# Patient Record
Sex: FEMALE | Race: WHITE | NOT HISPANIC OR LATINO | Employment: FULL TIME | URBAN - METROPOLITAN AREA
[De-identification: names, ages, dates, MRNs, and addresses within clinical notes are randomized per-mention and may not be internally consistent; named-entity substitution may affect disease eponyms.]

---

## 2017-05-10 ENCOUNTER — ALLSCRIPTS OFFICE VISIT (OUTPATIENT)
Dept: OTHER | Facility: OTHER | Age: 27
End: 2017-05-10

## 2017-05-10 LAB — S PYO AG THROAT QL: POSITIVE

## 2017-07-28 ENCOUNTER — TRANSCRIBE ORDERS (OUTPATIENT)
Dept: LAB | Facility: HOSPITAL | Age: 27
End: 2017-07-28

## 2017-07-28 ENCOUNTER — APPOINTMENT (OUTPATIENT)
Dept: LAB | Facility: HOSPITAL | Age: 27
End: 2017-07-28
Payer: COMMERCIAL

## 2017-07-28 DIAGNOSIS — Z00.8 HEALTH EXAMINATION IN POPULATION SURVEY: Primary | ICD-10-CM

## 2017-07-28 DIAGNOSIS — Z00.8 HEALTH EXAMINATION IN POPULATION SURVEY: ICD-10-CM

## 2017-07-28 LAB
CHOLEST SERPL-MCNC: 159 MG/DL (ref 50–200)
EST. AVERAGE GLUCOSE BLD GHB EST-MCNC: 105 MG/DL
HBA1C MFR BLD: 5.3 % (ref 4.2–6.3)
HDLC SERPL-MCNC: 53 MG/DL (ref 40–60)
LDLC SERPL CALC-MCNC: 91 MG/DL (ref 0–100)
TRIGL SERPL-MCNC: 75 MG/DL

## 2017-07-28 PROCEDURE — 83036 HEMOGLOBIN GLYCOSYLATED A1C: CPT

## 2017-07-28 PROCEDURE — 36415 COLL VENOUS BLD VENIPUNCTURE: CPT

## 2017-07-28 PROCEDURE — 80061 LIPID PANEL: CPT

## 2017-09-14 ENCOUNTER — GENERIC CONVERSION - ENCOUNTER (OUTPATIENT)
Dept: OTHER | Facility: OTHER | Age: 27
End: 2017-09-14

## 2017-09-27 ENCOUNTER — GENERIC CONVERSION - ENCOUNTER (OUTPATIENT)
Dept: OTHER | Facility: OTHER | Age: 27
End: 2017-09-27

## 2017-12-13 ENCOUNTER — GENERIC CONVERSION - ENCOUNTER (OUTPATIENT)
Dept: OTHER | Facility: OTHER | Age: 27
End: 2017-12-13

## 2017-12-13 ENCOUNTER — LAB REQUISITION (OUTPATIENT)
Dept: LAB | Facility: HOSPITAL | Age: 27
End: 2017-12-13
Payer: COMMERCIAL

## 2017-12-13 DIAGNOSIS — Z01.419 ENCOUNTER FOR GYNECOLOGICAL EXAMINATION WITHOUT ABNORMAL FINDING: ICD-10-CM

## 2017-12-13 PROCEDURE — G0145 SCR C/V CYTO,THINLAYER,RESCR: HCPCS | Performed by: OBSTETRICS & GYNECOLOGY

## 2017-12-20 LAB
LAB AP GYN PRIMARY INTERPRETATION: NORMAL
LAB AP LMP: NORMAL
Lab: NORMAL

## 2018-01-09 NOTE — MISCELLANEOUS
Message   Recorded as Task   Date: 2017 11:14 PM, Created By: Damien Patel   Task Name: Care Coordination   Assigned To: David Elizabeth   Regarding Patient: Noralyn Mohs, Status: Active   Comment:    [place],Raine - 2017 11:14 PM     TASK CREATED  Can you book Vicky Cedeño for an annual on  @ 0820 with me  She is aware of appt, and i'll come in early  David Elizabeth - 2017 8:50 AM     TASK REPLIED TO: Previously Assigned To Jeffrainer Elizabeth  Call pt no answer left message to call me bk  David Elizabeth - 2017 9:39 AM     TASK REPLIED TO: Previously Assigned To David Elizabeth   called again no answer left message to call our office to schedule yearly exam on 17 @8:20am if pt calls please add pt  Active Problems    1  Acne (706 1) (L70 9)   2  Hypertriglyceridemia (272 1) (E78 1)   3  Spontaneous  (634 90) (O03 9)    Current Meds   1  Amoxicillin-Pot Clavulanate 500-125 MG Oral Tablet (Augmentin); TAKE 1 TABLET BY   MOUTH 3 TIMES DAILY with food; Therapy: [id number] to (Last Rx:2017)  Requested for: [id number] Ordered   2  Benzoyl Peroxide 5 % External Gel; APPLY SPARINGLY TO THE AFFECTED AREA(S)   ONCE OR TWICE DAILY AS DIRECTED; Therapy: [id number] to (Last Rx:2016)  Requested for: [id number] Ordered   3  Ibuprofen 200 MG Oral Tablet; TAKE 3-4 TABLETS EVERY 6-8 HOURS WITH MEALS as   needed; Therapy: [id number] to (Last Rx:[id number]) Ordered   4  Mirena (52 MG) 20 MCG/24HR Intrauterine Intrauterine Device; USE AS DIRECTED; Therapy: (Recorded:2016) to Recorded    Allergies    1  No Known Drug Allergies    2   Seasonal    Signatures   Electronically signed by : Lissett Orourke MA; 2017  9:39AM EST                       (Author)

## 2018-01-14 VITALS
BODY MASS INDEX: 37.84 KG/M2 | DIASTOLIC BLOOD PRESSURE: 84 MMHG | HEART RATE: 88 BPM | WEIGHT: 241.13 LBS | TEMPERATURE: 98.7 F | RESPIRATION RATE: 16 BRPM | HEIGHT: 67 IN | SYSTOLIC BLOOD PRESSURE: 126 MMHG

## 2018-01-22 VITALS
HEIGHT: 67 IN | WEIGHT: 246 LBS | DIASTOLIC BLOOD PRESSURE: 80 MMHG | SYSTOLIC BLOOD PRESSURE: 120 MMHG | BODY MASS INDEX: 38.61 KG/M2

## 2018-01-22 VITALS
WEIGHT: 239 LBS | HEIGHT: 67 IN | SYSTOLIC BLOOD PRESSURE: 140 MMHG | BODY MASS INDEX: 37.51 KG/M2 | DIASTOLIC BLOOD PRESSURE: 90 MMHG

## 2018-01-24 VITALS
DIASTOLIC BLOOD PRESSURE: 70 MMHG | HEIGHT: 67 IN | WEIGHT: 235 LBS | SYSTOLIC BLOOD PRESSURE: 120 MMHG | BODY MASS INDEX: 36.88 KG/M2

## 2018-02-14 ENCOUNTER — OFFICE VISIT (OUTPATIENT)
Dept: OBGYN CLINIC | Facility: CLINIC | Age: 28
End: 2018-02-14
Payer: COMMERCIAL

## 2018-02-14 DIAGNOSIS — N91.2 AMENORRHEA: Primary | ICD-10-CM

## 2018-02-14 DIAGNOSIS — Z67.91 RH NEGATIVE STATE IN ANTEPARTUM PERIOD: ICD-10-CM

## 2018-02-14 DIAGNOSIS — O26.899 RH NEGATIVE STATE IN ANTEPARTUM PERIOD: ICD-10-CM

## 2018-02-14 PROCEDURE — 76817 TRANSVAGINAL US OBSTETRIC: CPT | Performed by: OBSTETRICS & GYNECOLOGY

## 2018-02-14 NOTE — PROGRESS NOTES
Early OB Ultrasound Procedure Note    Referring Physician: No ref  provider found  Technician: Study performed by the interpreting physician    Indications:  amenorrhea     LMP 12/26/17, notes that her menses were slighly longer than normal ~ 35d after her miscarriage this fall  Has had some very light brown spotting the last two days  Is Rh negative    No LMP recorded  , , with EGA of  7 weeks and 1   days      Procedure Details    Intrauterine gestational sac, + yolk sac, + very small fetal pole with ? Slow FHT       Findings:  Suspect early pregnancy, too early to confirm dating, will follow up in 2 weeks  Will check T&S  Encounter Diagnosis   Name Primary?     Rh negative state in antepartum period Yes

## 2018-02-23 ENCOUNTER — APPOINTMENT (OUTPATIENT)
Dept: LAB | Facility: HOSPITAL | Age: 28
End: 2018-02-23
Attending: OBSTETRICS & GYNECOLOGY
Payer: COMMERCIAL

## 2018-02-23 DIAGNOSIS — O26.899 RH NEGATIVE STATE IN ANTEPARTUM PERIOD: ICD-10-CM

## 2018-02-23 DIAGNOSIS — Z67.91 RH NEGATIVE STATE IN ANTEPARTUM PERIOD: ICD-10-CM

## 2018-02-23 LAB
ABO GROUP BLD: NORMAL
BLD GP AB SCN SERPL QL: NEGATIVE
RH BLD: NEGATIVE
SPECIMEN EXPIRATION DATE: NORMAL

## 2018-02-23 PROCEDURE — 86900 BLOOD TYPING SEROLOGIC ABO: CPT

## 2018-02-23 PROCEDURE — 86901 BLOOD TYPING SEROLOGIC RH(D): CPT

## 2018-02-23 PROCEDURE — 36415 COLL VENOUS BLD VENIPUNCTURE: CPT

## 2018-02-23 PROCEDURE — 86850 RBC ANTIBODY SCREEN: CPT

## 2018-02-26 DIAGNOSIS — O21.9 NAUSEA AND VOMITING DURING PREGNANCY: Primary | ICD-10-CM

## 2018-02-26 RX ORDER — ONDANSETRON 4 MG/1
4 TABLET, FILM COATED ORAL EVERY 6 HOURS PRN
Qty: 60 TABLET | Refills: 0 | Status: SHIPPED | OUTPATIENT
Start: 2018-02-26 | End: 2018-05-31

## 2018-02-28 ENCOUNTER — ULTRASOUND (OUTPATIENT)
Dept: OBGYN CLINIC | Facility: CLINIC | Age: 28
End: 2018-02-28
Payer: COMMERCIAL

## 2018-02-28 DIAGNOSIS — Z34.81 PRENATAL CARE, SUBSEQUENT PREGNANCY, FIRST TRIMESTER: Primary | ICD-10-CM

## 2018-02-28 DIAGNOSIS — O26.891 RH NEGATIVE STATUS DURING PREGNANCY IN FIRST TRIMESTER: ICD-10-CM

## 2018-02-28 DIAGNOSIS — Z67.91 RH NEGATIVE STATUS DURING PREGNANCY IN FIRST TRIMESTER: ICD-10-CM

## 2018-02-28 PROCEDURE — 76817 TRANSVAGINAL US OBSTETRIC: CPT | Performed by: OBSTETRICS & GYNECOLOGY

## 2018-02-28 NOTE — PROGRESS NOTES
Patient here for viability confirmation  Since her last visit has had increase in pregnancy symptoms - + nausea daily  Taking zofran PRN  She has not had any further bleeding  Blood type is A negative, Antibody negative  Will need rhogam 28wk and with any bleeding  TVUS performed today  Findings:  Viable IUP measures 8 wks  +   Normal adnexa b/l  EDC 10-10-18 based on US    PN panel slip provided to patient  Desires genetic screening - referral to [facility]  Will return for PN1 and OB intake

## 2018-03-14 ENCOUNTER — INITIAL PRENATAL (OUTPATIENT)
Dept: OBGYN CLINIC | Facility: CLINIC | Age: 28
End: 2018-03-14

## 2018-03-14 VITALS
BODY MASS INDEX: 37.33 KG/M2 | WEIGHT: 232.3 LBS | DIASTOLIC BLOOD PRESSURE: 80 MMHG | HEIGHT: 66 IN | SYSTOLIC BLOOD PRESSURE: 118 MMHG

## 2018-03-14 DIAGNOSIS — O26.891 RH NEGATIVE STATUS DURING PREGNANCY IN FIRST TRIMESTER: ICD-10-CM

## 2018-03-14 DIAGNOSIS — Z67.91 RH NEGATIVE STATUS DURING PREGNANCY IN FIRST TRIMESTER: ICD-10-CM

## 2018-03-14 PROCEDURE — OBC: Performed by: OBSTETRICS & GYNECOLOGY

## 2018-03-14 RX ORDER — VITAMIN A, VITAMIN C, VITAMIN D-3, VITAMIN E, VITAMIN B-1, VITAMIN B-2, NIACIN, VITAMIN B-6, CALCIUM, IRON, ZINC, COPPER 4000; 120; 400; 22; 1.84; 3; 20; 10; 1; 12; 200; 27; 25; 2 [IU]/1; MG/1; [IU]/1; MG/1; MG/1; MG/1; MG/1; MG/1; MG/1; UG/1; MG/1; MG/1; MG/1; MG/1
TABLET ORAL
Status: ON HOLD | COMMUNITY
End: 2020-07-20

## 2018-03-14 NOTE — PROGRESS NOTES
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

## 2018-03-15 ENCOUNTER — APPOINTMENT (OUTPATIENT)
Dept: LAB | Facility: HOSPITAL | Age: 28
End: 2018-03-15
Attending: OBSTETRICS & GYNECOLOGY
Payer: COMMERCIAL

## 2018-03-15 DIAGNOSIS — Z34.81 PRENATAL CARE, SUBSEQUENT PREGNANCY, FIRST TRIMESTER: ICD-10-CM

## 2018-03-15 LAB
BASOPHILS # BLD AUTO: 0.03 THOUSANDS/ΜL (ref 0–0.1)
BASOPHILS NFR BLD AUTO: 0 % (ref 0–1)
EOSINOPHIL # BLD AUTO: 0.09 THOUSAND/ΜL (ref 0–0.61)
EOSINOPHIL NFR BLD AUTO: 1 % (ref 0–6)
ERYTHROCYTE [DISTWIDTH] IN BLOOD BY AUTOMATED COUNT: 13.6 % (ref 11.6–15.1)
HBV SURFACE AG SER QL: NORMAL
HCT VFR BLD AUTO: 38.9 % (ref 34.8–46.1)
HGB BLD-MCNC: 13.1 G/DL (ref 11.5–15.4)
LYMPHOCYTES # BLD AUTO: 1.99 THOUSANDS/ΜL (ref 0.6–4.47)
LYMPHOCYTES NFR BLD AUTO: 21 % (ref 14–44)
MCH RBC QN AUTO: 27.9 PG (ref 26.8–34.3)
MCHC RBC AUTO-ENTMCNC: 33.7 G/DL (ref 31.4–37.4)
MCV RBC AUTO: 83 FL (ref 82–98)
MONOCYTES # BLD AUTO: 0.47 THOUSAND/ΜL (ref 0.17–1.22)
MONOCYTES NFR BLD AUTO: 5 % (ref 4–12)
NEUTROPHILS # BLD AUTO: 6.72 THOUSANDS/ΜL (ref 1.85–7.62)
NEUTS SEG NFR BLD AUTO: 73 % (ref 43–75)
NRBC BLD AUTO-RTO: 0 /100 WBCS
PLATELET # BLD AUTO: 280 THOUSANDS/UL (ref 149–390)
PMV BLD AUTO: 9.9 FL (ref 8.9–12.7)
RBC # BLD AUTO: 4.7 MILLION/UL (ref 3.81–5.12)
RUBV IGG SERPL IA-ACNC: 38.2 IU/ML
WBC # BLD AUTO: 9.32 THOUSAND/UL (ref 4.31–10.16)

## 2018-03-15 PROCEDURE — 87086 URINE CULTURE/COLONY COUNT: CPT

## 2018-03-15 PROCEDURE — 86592 SYPHILIS TEST NON-TREP QUAL: CPT

## 2018-03-15 PROCEDURE — 85025 COMPLETE CBC W/AUTO DIFF WBC: CPT

## 2018-03-15 PROCEDURE — 86762 RUBELLA ANTIBODY: CPT

## 2018-03-15 PROCEDURE — 36415 COLL VENOUS BLD VENIPUNCTURE: CPT

## 2018-03-15 PROCEDURE — 87340 HEPATITIS B SURFACE AG IA: CPT

## 2018-03-15 PROCEDURE — 87389 HIV-1 AG W/HIV-1&-2 AB AG IA: CPT

## 2018-03-16 LAB
BACTERIA UR CULT: NORMAL
HIV 1+2 AB+HIV1 P24 AG SERPL QL IA: NORMAL
RPR SER QL: NORMAL

## 2018-03-20 ENCOUNTER — TELEPHONE (OUTPATIENT)
Dept: OBGYN CLINIC | Facility: CLINIC | Age: 28
End: 2018-03-20

## 2018-03-20 NOTE — TELEPHONE ENCOUNTER
Roman Valderrama from [facility] called regarding swab that was sent for testing SMA and Cf  Advised that they are not able to run the test due to insufficent cells/DNA on swab

## 2018-03-29 ENCOUNTER — ROUTINE PRENATAL (OUTPATIENT)
Dept: PERINATAL CARE | Facility: CLINIC | Age: 28
End: 2018-03-29
Payer: COMMERCIAL

## 2018-03-29 ENCOUNTER — INITIAL PRENATAL (OUTPATIENT)
Dept: OBGYN CLINIC | Facility: CLINIC | Age: 28
End: 2018-03-29

## 2018-03-29 VITALS
DIASTOLIC BLOOD PRESSURE: 84 MMHG | HEIGHT: 66 IN | HEART RATE: 91 BPM | BODY MASS INDEX: 38.15 KG/M2 | SYSTOLIC BLOOD PRESSURE: 139 MMHG | WEIGHT: 237.4 LBS

## 2018-03-29 VITALS — BODY MASS INDEX: 39.35 KG/M2 | DIASTOLIC BLOOD PRESSURE: 81 MMHG | SYSTOLIC BLOOD PRESSURE: 120 MMHG | WEIGHT: 243.8 LBS

## 2018-03-29 DIAGNOSIS — Z3A.12 12 WEEKS GESTATION OF PREGNANCY: Primary | ICD-10-CM

## 2018-03-29 DIAGNOSIS — Z34.81 PRENATAL CARE, SUBSEQUENT PREGNANCY, FIRST TRIMESTER: Primary | ICD-10-CM

## 2018-03-29 DIAGNOSIS — Z67.91 RH NEGATIVE STATUS DURING PREGNANCY IN FIRST TRIMESTER: ICD-10-CM

## 2018-03-29 DIAGNOSIS — Z34.81 PRENATAL CARE, SUBSEQUENT PREGNANCY, FIRST TRIMESTER: ICD-10-CM

## 2018-03-29 DIAGNOSIS — O26.891 RH NEGATIVE STATUS DURING PREGNANCY IN FIRST TRIMESTER: ICD-10-CM

## 2018-03-29 DIAGNOSIS — Z3A.12 12 WEEKS GESTATION OF PREGNANCY: ICD-10-CM

## 2018-03-29 DIAGNOSIS — Z36.82 ENCOUNTER FOR ANTENATAL SCREENING FOR NUCHAL TRANSLUCENCY: Primary | ICD-10-CM

## 2018-03-29 PROCEDURE — 76813 OB US NUCHAL MEAS 1 GEST: CPT | Performed by: OBSTETRICS & GYNECOLOGY

## 2018-03-29 PROCEDURE — 76801 OB US < 14 WKS SINGLE FETUS: CPT | Performed by: OBSTETRICS & GYNECOLOGY

## 2018-03-29 PROCEDURE — 87491 CHLMYD TRACH DNA AMP PROBE: CPT | Performed by: OBSTETRICS & GYNECOLOGY

## 2018-03-29 PROCEDURE — 87591 N.GONORRHOEAE DNA AMP PROB: CPT | Performed by: OBSTETRICS & GYNECOLOGY

## 2018-03-29 PROCEDURE — PNV: Performed by: OBSTETRICS & GYNECOLOGY

## 2018-03-29 PROCEDURE — 99201 PR OFFICE OUTPATIENT NEW 10 MINUTES: CPT | Performed by: OBSTETRICS & GYNECOLOGY

## 2018-03-29 NOTE — PROGRESS NOTES
Problem List Items Addressed This Visit        Other    Rh negative status during pregnancy in first trimester    Prenatal care, subsequent pregnancy, first trimester - Primary     Cultures obtained  Had part 1 of sequential screen today - possible marginal cord insertion seen today  Repeat CF/SMA testing collected today as last sample was inadequate  PN panel reviewed and WNL, Rh neg, patient aware  Some elevation in BP noted at [facility] today, patient is going to start LDASA

## 2018-03-29 NOTE — ASSESSMENT & PLAN NOTE
Cultures obtained  Had part 1 of sequential screen today - possible marginal cord insertion seen today  Repeat CF/SMA testing collected today as last sample was inadequate  PN panel reviewed and WNL, Rh neg, patient aware  Some elevation in BP noted at [facility] today, patient is going to start LDASA

## 2018-03-29 NOTE — PATIENT INSTRUCTIONS
Thank you for choosing [facility] for your  care today  If you have any questions about your ultrasound or care, please do not hesitate to contact us or your primary obstetrician  Please return in 7 weeks for your next ultrasound to check on the fetal anatomy  Please consider purchasing a blood pressure cuff to check your blood pressure at home  This will help your providers follow your blood pressure and decide if any additional testing or monitoring is necessary  Please try to keep your weight gain to 11-20 pounds this pregnancy; this is best accomplished by regular aerobic exercise and healthy eating  Also please let me know if you need anything at all      Sincerely,    Ginny Baker MD  Attending Physician, [facility]

## 2018-03-29 NOTE — PROGRESS NOTES
[address]: Dr Mohsen Weber was seen today at 12w1d for nuchal translucency ultrasound  See ultrasound report under "OB Procedures" tab  Please don't hesitate to contact our office with any concerns or questions    Jose Jarvis MD

## 2018-03-30 LAB
CHLAMYDIA DNA CVX QL NAA+PROBE: NORMAL
N GONORRHOEA DNA GENITAL QL NAA+PROBE: NORMAL

## 2018-04-04 ENCOUNTER — TELEPHONE (OUTPATIENT)
Dept: PERINATAL CARE | Facility: CLINIC | Age: 28
End: 2018-04-04

## 2018-04-30 ENCOUNTER — ROUTINE PRENATAL (OUTPATIENT)
Dept: OBGYN CLINIC | Facility: MEDICAL CENTER | Age: 28
End: 2018-04-30

## 2018-04-30 ENCOUNTER — DOCUMENTATION (OUTPATIENT)
Dept: OBGYN CLINIC | Facility: MEDICAL CENTER | Age: 28
End: 2018-04-30

## 2018-04-30 VITALS — BODY MASS INDEX: 37.93 KG/M2 | SYSTOLIC BLOOD PRESSURE: 131 MMHG | DIASTOLIC BLOOD PRESSURE: 83 MMHG | WEIGHT: 235 LBS

## 2018-04-30 DIAGNOSIS — O26.892 RH NEGATIVE STATUS DURING PREGNANCY IN SECOND TRIMESTER: ICD-10-CM

## 2018-04-30 DIAGNOSIS — Z67.91 RH NEGATIVE STATUS DURING PREGNANCY IN SECOND TRIMESTER: ICD-10-CM

## 2018-04-30 DIAGNOSIS — Z34.82 PRENATAL CARE, SUBSEQUENT PREGNANCY, SECOND TRIMESTER: Primary | ICD-10-CM

## 2018-04-30 PROCEDURE — PNV: Performed by: OBSTETRICS & GYNECOLOGY

## 2018-04-30 NOTE — PROGRESS NOTES
Gonzalez Johnson is doing well  Feeling occasional flutters! Dehydrated - but covering Oncology, knows she should drink more water  CF/SMA negative  Has level II US scheduled

## 2018-05-16 ENCOUNTER — ROUTINE PRENATAL (OUTPATIENT)
Dept: PERINATAL CARE | Facility: CLINIC | Age: 28
End: 2018-05-16
Payer: COMMERCIAL

## 2018-05-16 VITALS
HEART RATE: 74 BPM | BODY MASS INDEX: 37.99 KG/M2 | HEIGHT: 66 IN | WEIGHT: 236.4 LBS | SYSTOLIC BLOOD PRESSURE: 115 MMHG | DIASTOLIC BLOOD PRESSURE: 67 MMHG

## 2018-05-16 DIAGNOSIS — O99.212 MATERNAL OBESITY SYNDROME IN SECOND TRIMESTER: Primary | ICD-10-CM

## 2018-05-16 DIAGNOSIS — Z67.91 RH NEGATIVE STATUS DURING PREGNANCY IN FIRST TRIMESTER: ICD-10-CM

## 2018-05-16 DIAGNOSIS — Z36.86 ENCOUNTER FOR ANTENATAL SCREENING FOR CERVICAL LENGTH: ICD-10-CM

## 2018-05-16 DIAGNOSIS — Z3A.19 19 WEEKS GESTATION OF PREGNANCY: ICD-10-CM

## 2018-05-16 DIAGNOSIS — Z34.82 PRENATAL CARE, SUBSEQUENT PREGNANCY, SECOND TRIMESTER: ICD-10-CM

## 2018-05-16 DIAGNOSIS — O26.891 RH NEGATIVE STATUS DURING PREGNANCY IN FIRST TRIMESTER: ICD-10-CM

## 2018-05-16 PROCEDURE — 76817 TRANSVAGINAL US OBSTETRIC: CPT | Performed by: OBSTETRICS & GYNECOLOGY

## 2018-05-16 PROCEDURE — 76811 OB US DETAILED SNGL FETUS: CPT | Performed by: OBSTETRICS & GYNECOLOGY

## 2018-05-16 PROCEDURE — 99212 OFFICE O/P EST SF 10 MIN: CPT | Performed by: OBSTETRICS & GYNECOLOGY

## 2018-05-16 NOTE — PROGRESS NOTES
A transvaginal ultrasound was performed  Sonographer note on use of High Level Disinfection Process (Trophon) for transvaginal probe# 4 used, serial [id number]    Venecia Rock RDMS, RDCS

## 2018-05-16 NOTE — LETTER
May 16, 2018     [address] [place] [address]    Patient: Vivi Tovar   YOB: 1990   Date of Visit: 5/16/2018       Dear Dr Mckeon Levels: Thank you for referring Vivi Tovar to me for evaluation  Below are my notes for this consultation  If you have questions, please do not hesitate to call me  I look forward to following your patient along with you  Sincerely,        Marv Wen MD        CC: No Recipients  Marv Wen MD  5/16/2018  1:46 PM  Sign at close encounter  Please refer to the [facility] ultrasound report in Ob Procedures for additional information regarding the visit to the [facility]  today

## 2018-05-16 NOTE — PROGRESS NOTES
Please refer to the [facility] ultrasound report in Ob Procedures for additional information regarding the visit to the [facility]  today

## 2018-05-18 ENCOUNTER — APPOINTMENT (OUTPATIENT)
Dept: LAB | Facility: HOSPITAL | Age: 28
End: 2018-05-18
Attending: OBSTETRICS & GYNECOLOGY
Payer: COMMERCIAL

## 2018-05-18 ENCOUNTER — TRANSCRIBE ORDERS (OUTPATIENT)
Dept: LAB | Facility: HOSPITAL | Age: 28
End: 2018-05-18

## 2018-05-18 DIAGNOSIS — Z34.92 SECOND TRIMESTER PREGNANCY: Primary | ICD-10-CM

## 2018-05-18 PROCEDURE — 36415 COLL VENOUS BLD VENIPUNCTURE: CPT

## 2018-05-19 LAB — SCAN RESULT: NORMAL

## 2018-05-22 ENCOUNTER — TELEPHONE (OUTPATIENT)
Dept: PERINATAL CARE | Facility: CLINIC | Age: 28
End: 2018-05-22

## 2018-05-31 ENCOUNTER — ROUTINE PRENATAL (OUTPATIENT)
Dept: OBGYN CLINIC | Facility: CLINIC | Age: 28
End: 2018-05-31

## 2018-05-31 VITALS — SYSTOLIC BLOOD PRESSURE: 130 MMHG | WEIGHT: 238.9 LBS | BODY MASS INDEX: 38.56 KG/M2 | DIASTOLIC BLOOD PRESSURE: 70 MMHG

## 2018-05-31 DIAGNOSIS — Z67.91 RH NEGATIVE STATUS DURING PREGNANCY IN FIRST TRIMESTER: Primary | ICD-10-CM

## 2018-05-31 DIAGNOSIS — Z34.82 PRENATAL CARE, SUBSEQUENT PREGNANCY, SECOND TRIMESTER: ICD-10-CM

## 2018-05-31 DIAGNOSIS — O26.891 RH NEGATIVE STATUS DURING PREGNANCY IN FIRST TRIMESTER: Primary | ICD-10-CM

## 2018-05-31 PROCEDURE — PNV: Performed by: OBSTETRICS & GYNECOLOGY

## 2018-05-31 NOTE — ASSESSMENT & PLAN NOTE
Patient doing well  Had level II - fetus did not cooperate for gender! But thinking girl  Has 32 week ultrasound scheduled  Baby moving well

## 2018-06-12 ENCOUNTER — OFFICE VISIT (OUTPATIENT)
Dept: FAMILY MEDICINE CLINIC | Facility: CLINIC | Age: 28
End: 2018-06-12
Payer: COMMERCIAL

## 2018-06-12 VITALS
TEMPERATURE: 98.5 F | WEIGHT: 236.3 LBS | DIASTOLIC BLOOD PRESSURE: 60 MMHG | HEART RATE: 88 BPM | RESPIRATION RATE: 16 BRPM | BODY MASS INDEX: 37.97 KG/M2 | SYSTOLIC BLOOD PRESSURE: 124 MMHG | HEIGHT: 66 IN

## 2018-06-12 DIAGNOSIS — R05.9 COUGH: ICD-10-CM

## 2018-06-12 DIAGNOSIS — J30.1 SEASONAL ALLERGIC RHINITIS DUE TO POLLEN: Primary | ICD-10-CM

## 2018-06-12 PROCEDURE — 3008F BODY MASS INDEX DOCD: CPT | Performed by: FAMILY MEDICINE

## 2018-06-12 PROCEDURE — 99214 OFFICE O/P EST MOD 30 MIN: CPT | Performed by: FAMILY MEDICINE

## 2018-06-12 RX ORDER — FLUTICASONE PROPIONATE 50 MCG
SPRAY, SUSPENSION (ML) NASAL
Qty: 16 G | Refills: 3 | Status: SHIPPED | OUTPATIENT
Start: 2018-06-12 | End: 2018-09-29 | Stop reason: ALTCHOICE

## 2018-06-12 NOTE — PROGRESS NOTES
Assessment/Plan:    Allergic rhinitis  Patient will continue the Claritin but it might take 2 weeks to kick in  In the meantime she will start Flonase 1 spray each nostril twice daily 1 of the times being after washing  She knows since she is pregnant this is about all she can do at this point  If she develops a temperature greater than 101 she will let me know  If she develops colored mucus that last throughout the entire day and more than 48 hours she will let me know  Subjective:   Maribel Elizondo is a 25 y  o female  Chief Complaint   Patient presents with    Cold Like Symptoms    Cough     Yesterday patient started with a itchy eyes and runny nose, as the night went on she got general malaise and started to cough such that she had post tussive emesis as well  She started taking Claritin, some Tylenol and some Mucinex  Mucinex clear the secretions which she just feels like she is drowning in her own fluids    No fevers        Past Medical History:   Diagnosis Date    Pregnancy with uncertain fetal viability     Last assessed - 17    Spontaneous       Social History   Substance Use Topics    Smoking status: Never Smoker    Smokeless tobacco: Never Used    Alcohol use Yes      Comment: social pre-pregnancy     Family History   Problem Relation Age of Onset    Hypertension Mother     Hypertension Father     Arthritis Father     Diabetes Father     Hyperlipidemia Father     Diabetes Maternal Grandmother     Diabetes Maternal Grandfather     Heart attack Maternal Grandfather     Diabetes Paternal Grandmother     Polycystic ovary syndrome Sister     Thyroid disease Brother     Heart attack Paternal Grandfather     Drug abuse Maternal Uncle     Alcohol abuse Maternal Uncle     Drug abuse Paternal Uncle     Alcohol abuse Paternal Uncle     Mental illness Neg Hx        MEDICATIONS REVIEWED AND UPDATED    Rest Of 10 Point Review Of System Negative    Objective:    Vitals: 06/12/18 1000   BP: 124/60   Pulse: 88   Resp: 16   Temp: 98 5 °F (36 9 °C)     Body mass index is 38 14 kg/m²      Physical Exam   Constitutional  Patient is fatigued and congested with a congested voice in no acute distress    Mental Status  Alert, Oriented, Cooperative, Memory function normal , clean, and reasonable    HEENT  TMs are dull turbinates are closed very pale white watery discharge pharynx benign    Neck  No neck mass, No thyromegaly, Good carotid upstrokes bilaterally, trachea midline positive click    Respiratory  Breath sounds normal, No rales, No rhonchi, No wheezing, normal palpation    Cardiac   Regular rhythm without ectopy or murmur no S3-S4, no heave lift or thrill to palpation    Vascular  No leg edema, No pedal edema    Muscular skeletal  No clubbing cyanosis , muscle tone normal    Skin  No appreciable rashes or abnormal appearing lesions

## 2018-06-12 NOTE — PATIENT INSTRUCTIONS
Allergic rhinitis  Patient will continue the Claritin but it might take 2 weeks to kick in  In the meantime she will start Flonase 1 spray each nostril twice daily 1 of the times being after washing  She knows since she is pregnant this is about all she can do at this point  If she develops a temperature greater than 101 she will let me know  If she develops colored mucus that last throughout the entire day and more than 48 hours she will let me know

## 2018-06-14 ENCOUNTER — TELEPHONE (OUTPATIENT)
Dept: OBGYN CLINIC | Facility: CLINIC | Age: 28
End: 2018-06-14

## 2018-06-29 ENCOUNTER — ROUTINE PRENATAL (OUTPATIENT)
Dept: OBGYN CLINIC | Facility: CLINIC | Age: 28
End: 2018-06-29

## 2018-06-29 VITALS — WEIGHT: 237.6 LBS | BODY MASS INDEX: 38.35 KG/M2 | SYSTOLIC BLOOD PRESSURE: 122 MMHG | DIASTOLIC BLOOD PRESSURE: 70 MMHG

## 2018-06-29 DIAGNOSIS — O26.891 RH NEGATIVE STATUS DURING PREGNANCY IN FIRST TRIMESTER: ICD-10-CM

## 2018-06-29 DIAGNOSIS — Z34.92 ENCOUNTER FOR PREGNANCY RELATED EXAMINATION IN SECOND TRIMESTER: ICD-10-CM

## 2018-06-29 DIAGNOSIS — Z67.91 RH NEGATIVE STATUS DURING PREGNANCY IN FIRST TRIMESTER: ICD-10-CM

## 2018-06-29 DIAGNOSIS — Z34.82 ENCOUNTER FOR SUPERVISION OF OTHER NORMAL PREGNANCY IN SECOND TRIMESTER: Primary | ICD-10-CM

## 2018-06-29 PROCEDURE — PNV: Performed by: NURSE PRACTITIONER

## 2018-06-29 RX ORDER — LORATADINE 10 MG/1
10 TABLET ORAL DAILY
COMMUNITY
End: 2020-01-22 | Stop reason: ALTCHOICE

## 2018-06-29 NOTE — ASSESSMENT & PLAN NOTE
Denies OB complaints  Good fetal movement  Denies contractions, cramping, leakage of fluid or vaginal bleeding  28 wk lab slips provided  S/p flu vaccine  Reviewed reasons to call

## 2018-06-29 NOTE — PROGRESS NOTES
Problem List Items Addressed This Visit     Rh negative status during pregnancy in first trimester     Rhogam at 28 weeks planned  Supervision of normal pregnancy in second trimester - Primary     Denies OB complaints  Good fetal movement  Denies contractions, cramping, leakage of fluid or vaginal bleeding  28 wk lab slips provided  S/p flu vaccine  Reviewed reasons to call              Other Visit Diagnoses     Encounter for pregnancy related examination in second trimester        Relevant Orders    CBC and differential    Glucose, 1H PG    RPR    Type and screen

## 2018-07-02 ENCOUNTER — TELEPHONE (OUTPATIENT)
Dept: OBGYN CLINIC | Facility: CLINIC | Age: 28
End: 2018-07-02

## 2018-07-10 ENCOUNTER — TELEPHONE (OUTPATIENT)
Dept: OBGYN CLINIC | Facility: CLINIC | Age: 28
End: 2018-07-10

## 2018-07-10 DIAGNOSIS — O26.892 HEARTBURN DURING PREGNANCY IN SECOND TRIMESTER: Primary | ICD-10-CM

## 2018-07-10 DIAGNOSIS — R12 HEARTBURN DURING PREGNANCY IN SECOND TRIMESTER: Primary | ICD-10-CM

## 2018-07-10 RX ORDER — RANITIDINE HCL 75 MG
75 TABLET ORAL 2 TIMES DAILY
Qty: 60 TABLET | Refills: 3 | Status: SHIPPED | OUTPATIENT
Start: 2018-07-10 | End: 2018-07-13

## 2018-07-13 ENCOUNTER — ROUTINE PRENATAL (OUTPATIENT)
Dept: OBGYN CLINIC | Facility: CLINIC | Age: 28
End: 2018-07-13

## 2018-07-13 VITALS — DIASTOLIC BLOOD PRESSURE: 66 MMHG | WEIGHT: 239.4 LBS | BODY MASS INDEX: 38.64 KG/M2 | SYSTOLIC BLOOD PRESSURE: 128 MMHG

## 2018-07-13 DIAGNOSIS — Z34.82 ENCOUNTER FOR SUPERVISION OF OTHER NORMAL PREGNANCY IN SECOND TRIMESTER: Primary | ICD-10-CM

## 2018-07-13 DIAGNOSIS — K21.9 GASTROESOPHAGEAL REFLUX DISEASE WITHOUT ESOPHAGITIS: ICD-10-CM

## 2018-07-13 DIAGNOSIS — O26.891 RH NEGATIVE STATUS DURING PREGNANCY IN FIRST TRIMESTER: ICD-10-CM

## 2018-07-13 DIAGNOSIS — Z67.91 RH NEGATIVE STATUS DURING PREGNANCY IN FIRST TRIMESTER: ICD-10-CM

## 2018-07-13 PROCEDURE — PNV: Performed by: NURSE PRACTITIONER

## 2018-07-13 RX ORDER — RANITIDINE HCL 75 MG
75 TABLET ORAL 2 TIMES DAILY
Qty: 90 TABLET | Refills: 2 | Status: SHIPPED | OUTPATIENT
Start: 2018-07-13 | End: 2020-01-22 | Stop reason: ALTCHOICE

## 2018-07-13 NOTE — ASSESSMENT & PLAN NOTE
Denies OB complaints  Good fetal movement  Denies contractions, cramping, leakage of fluid or vaginal bleeding  The patient plans to complete 28 week labs  She is aware of Rhogam recommendations and will receive at her next visit  Reviewed reasons to call

## 2018-07-13 NOTE — PROGRESS NOTES
Problem List Items Addressed This Visit     Rh negative status during pregnancy in first trimester    Supervision of normal pregnancy in second trimester - Primary     Denies OB complaints  Good fetal movement  Denies contractions, cramping, leakage of fluid or vaginal bleeding  The patient plans to complete 28 week labs  She is aware of Rhogam recommendations and will receive at her next visit  Reviewed reasons to call  Gastroesophageal reflux disease without esophagitis     Rx provided for Zantac  She is aware of dietary and positional recommendations            Relevant Medications    ranitidine (ZANTAC) 75 MG tablet

## 2018-07-25 ENCOUNTER — APPOINTMENT (OUTPATIENT)
Dept: LAB | Facility: HOSPITAL | Age: 28
End: 2018-07-25
Payer: COMMERCIAL

## 2018-07-25 ENCOUNTER — TRANSCRIBE ORDERS (OUTPATIENT)
Dept: LAB | Facility: HOSPITAL | Age: 28
End: 2018-07-25

## 2018-07-25 DIAGNOSIS — Z00.8 HEALTH EXAMINATION IN POPULATION SURVEY: Primary | ICD-10-CM

## 2018-07-25 DIAGNOSIS — Z34.92 ENCOUNTER FOR PREGNANCY RELATED EXAMINATION IN SECOND TRIMESTER: ICD-10-CM

## 2018-07-25 DIAGNOSIS — Z00.8 HEALTH EXAMINATION IN POPULATION SURVEY: ICD-10-CM

## 2018-07-25 LAB
ABO GROUP BLD: NORMAL
BASOPHILS # BLD AUTO: 0.05 THOUSANDS/ΜL (ref 0–0.1)
BASOPHILS NFR BLD AUTO: 0 % (ref 0–1)
BLD GP AB SCN SERPL QL: NEGATIVE
CHOLEST SERPL-MCNC: 261 MG/DL (ref 50–200)
EOSINOPHIL # BLD AUTO: 0.13 THOUSAND/ΜL (ref 0–0.61)
EOSINOPHIL NFR BLD AUTO: 1 % (ref 0–6)
ERYTHROCYTE [DISTWIDTH] IN BLOOD BY AUTOMATED COUNT: 13.3 % (ref 11.6–15.1)
EST. AVERAGE GLUCOSE BLD GHB EST-MCNC: 91 MG/DL
GLUCOSE 1H P 50 G GLC PO SERPL-MCNC: 79 MG/DL
HBA1C MFR BLD: 4.8 % (ref 4.2–6.3)
HCT VFR BLD AUTO: 34.4 % (ref 34.8–46.1)
HDLC SERPL-MCNC: 71 MG/DL (ref 40–60)
HGB BLD-MCNC: 11.1 G/DL (ref 11.5–15.4)
IMM GRANULOCYTES # BLD AUTO: 0.06 THOUSAND/UL (ref 0–0.2)
IMM GRANULOCYTES NFR BLD AUTO: 1 % (ref 0–2)
LDLC SERPL CALC-MCNC: 155 MG/DL (ref 0–100)
LYMPHOCYTES # BLD AUTO: 2.68 THOUSANDS/ΜL (ref 0.6–4.47)
LYMPHOCYTES NFR BLD AUTO: 23 % (ref 14–44)
MCH RBC QN AUTO: 27.4 PG (ref 26.8–34.3)
MCHC RBC AUTO-ENTMCNC: 32.3 G/DL (ref 31.4–37.4)
MCV RBC AUTO: 85 FL (ref 82–98)
MONOCYTES # BLD AUTO: 0.77 THOUSAND/ΜL (ref 0.17–1.22)
MONOCYTES NFR BLD AUTO: 7 % (ref 4–12)
NEUTROPHILS # BLD AUTO: 8.21 THOUSANDS/ΜL (ref 1.85–7.62)
NEUTS SEG NFR BLD AUTO: 68 % (ref 43–75)
NONHDLC SERPL-MCNC: 190 MG/DL
NRBC BLD AUTO-RTO: 0 /100 WBCS
PLATELET # BLD AUTO: 247 THOUSANDS/UL (ref 149–390)
PMV BLD AUTO: 10.1 FL (ref 8.9–12.7)
RBC # BLD AUTO: 4.05 MILLION/UL (ref 3.81–5.12)
RH BLD: NEGATIVE
RPR SER QL: NORMAL
SPECIMEN EXPIRATION DATE: NORMAL
TRIGL SERPL-MCNC: 177 MG/DL
WBC # BLD AUTO: 11.9 THOUSAND/UL (ref 4.31–10.16)

## 2018-07-25 PROCEDURE — 82950 GLUCOSE TEST: CPT

## 2018-07-25 PROCEDURE — 36415 COLL VENOUS BLD VENIPUNCTURE: CPT

## 2018-07-25 PROCEDURE — 86900 BLOOD TYPING SEROLOGIC ABO: CPT

## 2018-07-25 PROCEDURE — 86901 BLOOD TYPING SEROLOGIC RH(D): CPT

## 2018-07-25 PROCEDURE — 83036 HEMOGLOBIN GLYCOSYLATED A1C: CPT

## 2018-07-25 PROCEDURE — 86850 RBC ANTIBODY SCREEN: CPT

## 2018-07-25 PROCEDURE — 80061 LIPID PANEL: CPT

## 2018-07-25 PROCEDURE — 85025 COMPLETE CBC W/AUTO DIFF WBC: CPT

## 2018-07-25 PROCEDURE — 86592 SYPHILIS TEST NON-TREP QUAL: CPT

## 2018-07-27 ENCOUNTER — ROUTINE PRENATAL (OUTPATIENT)
Dept: OBGYN CLINIC | Facility: CLINIC | Age: 28
End: 2018-07-27
Payer: COMMERCIAL

## 2018-07-27 VITALS — BODY MASS INDEX: 38.51 KG/M2 | WEIGHT: 238.6 LBS | SYSTOLIC BLOOD PRESSURE: 106 MMHG | DIASTOLIC BLOOD PRESSURE: 64 MMHG

## 2018-07-27 DIAGNOSIS — Z23 NEED FOR TETANUS, DIPHTHERIA, AND ACELLULAR PERTUSSIS (TDAP) VACCINE IN PATIENT OF ADOLESCENT AGE OR OLDER: Primary | ICD-10-CM

## 2018-07-27 DIAGNOSIS — O26.893 RH NEGATIVE STATUS DURING PREGNANCY IN THIRD TRIMESTER: ICD-10-CM

## 2018-07-27 DIAGNOSIS — Z34.83 ENCOUNTER FOR SUPERVISION OF OTHER NORMAL PREGNANCY, THIRD TRIMESTER: ICD-10-CM

## 2018-07-27 DIAGNOSIS — Z67.91 RH NEGATIVE STATUS DURING PREGNANCY IN THIRD TRIMESTER: ICD-10-CM

## 2018-07-27 DIAGNOSIS — O36.1190 ABO INCOMPATIBILITY IN PREGNANCY: ICD-10-CM

## 2018-07-27 PROCEDURE — 96372 THER/PROPH/DIAG INJ SC/IM: CPT

## 2018-07-27 PROCEDURE — 90715 TDAP VACCINE 7 YRS/> IM: CPT

## 2018-07-27 PROCEDURE — 90471 IMMUNIZATION ADMIN: CPT | Performed by: NURSE PRACTITIONER

## 2018-07-27 PROCEDURE — PNV: Performed by: NURSE PRACTITIONER

## 2018-07-27 NOTE — ASSESSMENT & PLAN NOTE
Denies OB complaints  Good fetal movement  Denies contractions, cramping, leakage of fluid or vaginal bleeding  Tdap and Rhogam administered today  28 wk labs WNL  Baby and Me considerations reinforced  Reviewed  labor precautions and FKCs

## 2018-07-27 NOTE — PROGRESS NOTES
Problem List Items Addressed This Visit     Rh negative status during pregnancy in third trimester     Rhogam administered today  Encounter for supervision of other normal pregnancy, third trimester     Denies OB complaints  Good fetal movement  Denies contractions, cramping, leakage of fluid or vaginal bleeding  Tdap and Rhogam administered today  28 wk labs WNL  Baby and Me considerations reinforced  Reviewed  labor precautions and FKCs                Other Visit Diagnoses     Need for tetanus, diphtheria, and acellular pertussis (Tdap) vaccine in patient of adolescent age or older    -  Primary    Relevant Orders    TDAP VACCINE GREATER THAN OR EQUAL TO 6YO IM    ABO incompatibility in pregnancy        Relevant Medications    Rho(D) immune globulin (RHOGAM ULTRA-FILTERED PLUS) IM injection 300 mcg

## 2018-08-06 ENCOUNTER — HOSPITAL ENCOUNTER (OUTPATIENT)
Dept: NON INVASIVE DIAGNOSTICS | Facility: HOSPITAL | Age: 28
Discharge: HOME | End: 2018-08-06
Attending: OBSTETRICS & GYNECOLOGY
Payer: COMMERCIAL

## 2018-08-06 DIAGNOSIS — M79.661 PAIN IN RIGHT LOWER LEG: ICD-10-CM

## 2018-08-06 DIAGNOSIS — M79.661 PAIN IN RIGHT LOWER LEG: Primary | ICD-10-CM

## 2018-08-06 PROCEDURE — 93971 EXTREMITY STUDY: CPT

## 2018-08-07 PROCEDURE — 93971 EXTREMITY STUDY: CPT | Performed by: SURGERY

## 2018-08-09 ENCOUNTER — ROUTINE PRENATAL (OUTPATIENT)
Dept: OBGYN CLINIC | Facility: CLINIC | Age: 28
End: 2018-08-09

## 2018-08-09 VITALS — BODY MASS INDEX: 38.25 KG/M2 | WEIGHT: 237 LBS | SYSTOLIC BLOOD PRESSURE: 112 MMHG | DIASTOLIC BLOOD PRESSURE: 70 MMHG

## 2018-08-09 DIAGNOSIS — Z34.83 ENCOUNTER FOR SUPERVISION OF OTHER NORMAL PREGNANCY, THIRD TRIMESTER: Primary | ICD-10-CM

## 2018-08-09 PROCEDURE — PNV: Performed by: PHYSICIAN ASSISTANT

## 2018-08-09 NOTE — ASSESSMENT & PLAN NOTE
RTO 2 wks  Not able to urinate, went right before she got here and knows she didn't drink much today    Reviewed PTL precautions, fetal kick counts and reasons to call

## 2018-08-09 NOTE — PROGRESS NOTES
Patient w/o complaints  (+) good fetal movement, denies any bleeding, fluid leakage or ctx  For 32 week growth US    Problem List Items Addressed This Visit     Encounter for supervision of other normal pregnancy, third trimester - Primary     RTO 2 wks  Not able to urinate, went right before she got here and knows she didn't drink much today    Reviewed PTL precautions, fetal kick counts and reasons to call

## 2018-08-15 ENCOUNTER — ULTRASOUND (OUTPATIENT)
Dept: PERINATAL CARE | Facility: CLINIC | Age: 28
End: 2018-08-15
Payer: COMMERCIAL

## 2018-08-15 VITALS
BODY MASS INDEX: 38.46 KG/M2 | HEIGHT: 66 IN | DIASTOLIC BLOOD PRESSURE: 87 MMHG | SYSTOLIC BLOOD PRESSURE: 125 MMHG | HEART RATE: 76 BPM | WEIGHT: 239.3 LBS

## 2018-08-15 DIAGNOSIS — Z3A.32 32 WEEKS GESTATION OF PREGNANCY: ICD-10-CM

## 2018-08-15 DIAGNOSIS — O99.213 OBESITY AFFECTING PREGNANCY IN THIRD TRIMESTER: ICD-10-CM

## 2018-08-15 DIAGNOSIS — Z36.89 ENCOUNTER FOR ULTRASOUND TO CHECK FETAL GROWTH: Primary | ICD-10-CM

## 2018-08-15 PROCEDURE — 76816 OB US FOLLOW-UP PER FETUS: CPT | Performed by: OBSTETRICS & GYNECOLOGY

## 2018-08-15 NOTE — PROGRESS NOTES
[address]: Dr Romel Cagle was seen today at 32w0d for fetal growth assessment ultrasound  See ultrasound report under "OB Procedures" tab  Please don't hesitate to contact our office with any concerns or questions    Owen Whyte MD

## 2018-09-06 ENCOUNTER — ROUTINE PRENATAL (OUTPATIENT)
Dept: OBGYN CLINIC | Facility: CLINIC | Age: 28
End: 2018-09-06

## 2018-09-06 VITALS — BODY MASS INDEX: 39.71 KG/M2 | WEIGHT: 246 LBS | DIASTOLIC BLOOD PRESSURE: 78 MMHG | SYSTOLIC BLOOD PRESSURE: 120 MMHG

## 2018-09-06 DIAGNOSIS — Z34.83 ENCOUNTER FOR SUPERVISION OF OTHER NORMAL PREGNANCY, THIRD TRIMESTER: ICD-10-CM

## 2018-09-06 PROCEDURE — PNV: Performed by: PHYSICIAN ASSISTANT

## 2018-09-06 NOTE — PROGRESS NOTES
Problem List Items Addressed This Visit     Encounter for supervision of other normal pregnancy, third trimester     Feels well  Good fetal movement  It's a girl - Daniela  GBS next visit  Received TDAP and RhoGam

## 2018-09-11 ENCOUNTER — TELEPHONE (OUTPATIENT)
Dept: OBGYN CLINIC | Facility: CLINIC | Age: 28
End: 2018-09-11

## 2018-09-11 NOTE — TELEPHONE ENCOUNTER
Left message for pt to call me back regarding her completed FMLA - is it to be faxed to HR or is she picking up?

## 2018-09-12 ENCOUNTER — ROUTINE PRENATAL (OUTPATIENT)
Dept: OBGYN CLINIC | Facility: CLINIC | Age: 28
End: 2018-09-12

## 2018-09-12 VITALS — DIASTOLIC BLOOD PRESSURE: 78 MMHG | SYSTOLIC BLOOD PRESSURE: 128 MMHG

## 2018-09-12 DIAGNOSIS — Z34.83 ENCOUNTER FOR SUPERVISION OF OTHER NORMAL PREGNANCY, THIRD TRIMESTER: Primary | ICD-10-CM

## 2018-09-12 DIAGNOSIS — Z34.93 THIRD TRIMESTER PREGNANCY: ICD-10-CM

## 2018-09-12 PROCEDURE — 87653 STREP B DNA AMP PROBE: CPT | Performed by: OBSTETRICS & GYNECOLOGY

## 2018-09-12 PROCEDURE — PNV: Performed by: OBSTETRICS & GYNECOLOGY

## 2018-09-12 NOTE — PROGRESS NOTES
Problem List Items Addressed This Visit        Other    Encounter for supervision of other normal pregnancy, third trimester - Primary     Patient doing well  No concerns or complaints  GBS collected today               Other Visit Diagnoses     Third trimester pregnancy        Relevant Orders    Strep B DNA probe, amplification

## 2018-09-14 LAB — GP B STREP DNA SPEC QL NAA+PROBE: NORMAL

## 2018-09-21 ENCOUNTER — DOCUMENTATION (OUTPATIENT)
Dept: OBGYN CLINIC | Facility: CLINIC | Age: 28
End: 2018-09-21

## 2018-09-21 NOTE — PROGRESS NOTES
Patient doing well  Some cramping today, nothing regular yet  GBS negative, patient aware  /84  Wt: 112kg, stable from last week  Urine protein neg, urine glucose neg  FHT:135  Fetus is vertex     SVE: 1/50/-2, soft, external os 2-3cm

## 2018-09-24 ENCOUNTER — ROUTINE PRENATAL (OUTPATIENT)
Dept: OBGYN CLINIC | Facility: CLINIC | Age: 28
End: 2018-09-24
Payer: COMMERCIAL

## 2018-09-24 DIAGNOSIS — Z23 NEED FOR INFLUENZA VACCINATION: Primary | ICD-10-CM

## 2018-09-24 DIAGNOSIS — Z34.83 ENCOUNTER FOR SUPERVISION OF OTHER NORMAL PREGNANCY, THIRD TRIMESTER: ICD-10-CM

## 2018-09-24 DIAGNOSIS — O26.893 RH NEGATIVE STATUS DURING PREGNANCY IN THIRD TRIMESTER: ICD-10-CM

## 2018-09-24 DIAGNOSIS — Z67.91 RH NEGATIVE STATUS DURING PREGNANCY IN THIRD TRIMESTER: ICD-10-CM

## 2018-09-24 PROCEDURE — 90471 IMMUNIZATION ADMIN: CPT

## 2018-09-24 PROCEDURE — 90686 IIV4 VACC NO PRSV 0.5 ML IM: CPT

## 2018-09-28 ENCOUNTER — ROUTINE PRENATAL (OUTPATIENT)
Dept: OBGYN CLINIC | Facility: CLINIC | Age: 28
End: 2018-09-28

## 2018-09-28 VITALS — SYSTOLIC BLOOD PRESSURE: 130 MMHG | DIASTOLIC BLOOD PRESSURE: 73 MMHG | BODY MASS INDEX: 39.87 KG/M2 | WEIGHT: 247 LBS

## 2018-09-28 DIAGNOSIS — Z34.83 ENCOUNTER FOR SUPERVISION OF OTHER NORMAL PREGNANCY, THIRD TRIMESTER: Primary | ICD-10-CM

## 2018-09-28 PROCEDURE — PNV: Performed by: OBSTETRICS & GYNECOLOGY

## 2018-10-03 ENCOUNTER — HOSPITAL ENCOUNTER (INPATIENT)
Facility: HOSPITAL | Age: 28
LOS: 2 days | Discharge: HOME | End: 2018-10-05
Attending: OBSTETRICS & GYNECOLOGY | Admitting: OBSTETRICS & GYNECOLOGY
Payer: COMMERCIAL

## 2018-10-03 DIAGNOSIS — Z34.83 ENCOUNTER FOR SUPERVISION OF OTHER NORMAL PREGNANCY, THIRD TRIMESTER: Primary | ICD-10-CM

## 2018-10-03 PROCEDURE — 99215 OFFICE O/P EST HI 40 MIN: CPT

## 2018-10-03 PROCEDURE — 4A1HXCZ MONITORING OF PRODUCTS OF CONCEPTION, CARDIAC RATE, EXTERNAL APPROACH: ICD-10-PCS | Performed by: OBSTETRICS & GYNECOLOGY

## 2018-10-03 RX ORDER — SODIUM CHLORIDE, SODIUM LACTATE, POTASSIUM CHLORIDE, CALCIUM CHLORIDE 600; 310; 30; 20 MG/100ML; MG/100ML; MG/100ML; MG/100ML
125 INJECTION, SOLUTION INTRAVENOUS CONTINUOUS
Status: DISCONTINUED | OUTPATIENT
Start: 2018-10-04 | End: 2018-10-04

## 2018-10-04 ENCOUNTER — ANESTHESIA (INPATIENT)
Dept: LABOR AND DELIVERY | Facility: HOSPITAL | Age: 28
End: 2018-10-04
Payer: COMMERCIAL

## 2018-10-04 ENCOUNTER — ANESTHESIA EVENT (INPATIENT)
Dept: LABOR AND DELIVERY | Facility: HOSPITAL | Age: 28
End: 2018-10-04
Payer: COMMERCIAL

## 2018-10-04 LAB
ABO GROUP BLD: NORMAL
ABO GROUP BLD: NORMAL
BASE EXCESS BLDCOA CALC-SCNC: -3.9 MMOL/L (ref 3–11)
BASE EXCESS BLDCOV CALC-SCNC: -5.2 MMOL/L (ref 1–9)
BLD GP AB SCN SERPL QL: POSITIVE
BLD GP AB SCN SERPL QL: POSITIVE
ERYTHROCYTE [DISTWIDTH] IN BLOOD BY AUTOMATED COUNT: 13.2 % (ref 11.6–15.1)
FETAL CELL SCN BLD QL ROSETTE: NEGATIVE
HCO3 BLDCOA-SCNC: 21.5 MMOL/L (ref 17.3–27.3)
HCO3 BLDCOV-SCNC: 20.1 MMOL/L (ref 12.2–28.6)
HCT VFR BLD AUTO: 34.3 % (ref 34.8–46.1)
HGB BLD-MCNC: 11.3 G/DL (ref 11.5–15.4)
MCH RBC QN AUTO: 26.7 PG (ref 26.8–34.3)
MCHC RBC AUTO-ENTMCNC: 32.9 G/DL (ref 31.4–37.4)
MCV RBC AUTO: 81 FL (ref 82–98)
O2 CT VFR BLDCOA CALC: 15.5 ML/DL
OXYHGB MFR BLDCOA: 66.5 %
OXYHGB MFR BLDCOV: 68.4 %
PCO2 BLDCOA: 40.5 MM[HG] (ref 30–60)
PCO2 BLDCOV: 38.7 MM HG (ref 27–43)
PH BLDCOA: 7.34 [PH] (ref 7.23–7.43)
PH BLDCOV: 7.33 [PH] (ref 7.19–7.49)
PLATELET # BLD AUTO: 276 THOUSANDS/UL (ref 149–390)
PMV BLD AUTO: 10.9 FL (ref 8.9–12.7)
PO2 BLDCOA: 29 MM HG (ref 5–25)
PO2 BLDCOV: 31.2 MM HG (ref 15–45)
RBC # BLD AUTO: 4.23 MILLION/UL (ref 3.81–5.12)
RH BLD: NEGATIVE
RH BLD: NEGATIVE
RPR SER QL: NORMAL
SAO2 % BLDCOV: 15.9 ML/DL
SPECIMEN EXPIRATION DATE: NORMAL
WBC # BLD AUTO: 11.91 THOUSAND/UL (ref 4.31–10.16)

## 2018-10-04 PROCEDURE — 86850 RBC ANTIBODY SCREEN: CPT | Performed by: OBSTETRICS & GYNECOLOGY

## 2018-10-04 PROCEDURE — 86900 BLOOD TYPING SEROLOGIC ABO: CPT | Performed by: OBSTETRICS & GYNECOLOGY

## 2018-10-04 PROCEDURE — 86592 SYPHILIS TEST NON-TREP QUAL: CPT | Performed by: OBSTETRICS & GYNECOLOGY

## 2018-10-04 PROCEDURE — 85461 HEMOGLOBIN FETAL: CPT | Performed by: OBSTETRICS & GYNECOLOGY

## 2018-10-04 PROCEDURE — 0KQM0ZZ REPAIR PERINEUM MUSCLE, OPEN APPROACH: ICD-10-PCS | Performed by: OBSTETRICS & GYNECOLOGY

## 2018-10-04 PROCEDURE — 59400 OBSTETRICAL CARE: CPT | Performed by: OBSTETRICS & GYNECOLOGY

## 2018-10-04 PROCEDURE — 86901 BLOOD TYPING SEROLOGIC RH(D): CPT | Performed by: OBSTETRICS & GYNECOLOGY

## 2018-10-04 PROCEDURE — 85027 COMPLETE CBC AUTOMATED: CPT | Performed by: OBSTETRICS & GYNECOLOGY

## 2018-10-04 PROCEDURE — 82805 BLOOD GASES W/O2 SATURATION: CPT | Performed by: OBSTETRICS & GYNECOLOGY

## 2018-10-04 RX ORDER — LIDOCAINE HYDROCHLORIDE 10 MG/ML
INJECTION, SOLUTION EPIDURAL; INFILTRATION; INTRACAUDAL; PERINEURAL
Status: DISPENSED
Start: 2018-10-04 | End: 2018-10-04

## 2018-10-04 RX ORDER — IBUPROFEN 600 MG/1
600 TABLET ORAL EVERY 6 HOURS PRN
Status: DISCONTINUED | OUTPATIENT
Start: 2018-10-04 | End: 2018-10-05 | Stop reason: HOSPADM

## 2018-10-04 RX ORDER — ONDANSETRON 2 MG/ML
4 INJECTION INTRAMUSCULAR; INTRAVENOUS EVERY 8 HOURS PRN
Status: DISCONTINUED | OUTPATIENT
Start: 2018-10-04 | End: 2018-10-05 | Stop reason: HOSPADM

## 2018-10-04 RX ORDER — ROPIVACAINE HYDROCHLORIDE 2 MG/ML
INJECTION, SOLUTION EPIDURAL; INFILTRATION; PERINEURAL AS NEEDED
Status: DISCONTINUED | OUTPATIENT
Start: 2018-10-04 | End: 2018-10-04 | Stop reason: SURG

## 2018-10-04 RX ORDER — FAMOTIDINE 20 MG/1
20 TABLET, FILM COATED ORAL 2 TIMES DAILY
Status: DISCONTINUED | OUTPATIENT
Start: 2018-10-04 | End: 2018-10-05 | Stop reason: HOSPADM

## 2018-10-04 RX ORDER — LIDOCAINE HYDROCHLORIDE AND EPINEPHRINE 15; 5 MG/ML; UG/ML
INJECTION, SOLUTION EPIDURAL AS NEEDED
Status: DISCONTINUED | OUTPATIENT
Start: 2018-10-04 | End: 2018-10-04 | Stop reason: SURG

## 2018-10-04 RX ORDER — DIPHENHYDRAMINE HCL 25 MG
25 TABLET ORAL EVERY 6 HOURS PRN
Status: DISCONTINUED | OUTPATIENT
Start: 2018-10-04 | End: 2018-10-05 | Stop reason: HOSPADM

## 2018-10-04 RX ORDER — OXYCODONE HYDROCHLORIDE AND ACETAMINOPHEN 5; 325 MG/1; MG/1
1 TABLET ORAL EVERY 4 HOURS PRN
Status: DISCONTINUED | OUTPATIENT
Start: 2018-10-04 | End: 2018-10-05 | Stop reason: HOSPADM

## 2018-10-04 RX ORDER — CALCIUM CARBONATE 200(500)MG
1000 TABLET,CHEWABLE ORAL DAILY PRN
Status: DISCONTINUED | OUTPATIENT
Start: 2018-10-04 | End: 2018-10-05 | Stop reason: HOSPADM

## 2018-10-04 RX ORDER — FENTANYL CITRATE 50 UG/ML
INJECTION, SOLUTION INTRAMUSCULAR; INTRAVENOUS
Status: COMPLETED
Start: 2018-10-04 | End: 2018-10-04

## 2018-10-04 RX ORDER — ACETAMINOPHEN 325 MG/1
650 TABLET ORAL EVERY 6 HOURS PRN
Status: DISCONTINUED | OUTPATIENT
Start: 2018-10-04 | End: 2018-10-05 | Stop reason: HOSPADM

## 2018-10-04 RX ORDER — FENTANYL CITRATE 50 UG/ML
INJECTION, SOLUTION INTRAMUSCULAR; INTRAVENOUS AS NEEDED
Status: DISCONTINUED | OUTPATIENT
Start: 2018-10-04 | End: 2018-10-04 | Stop reason: SURG

## 2018-10-04 RX ORDER — RANITIDINE 150 MG/1
150 TABLET ORAL 2 TIMES DAILY
Status: DISCONTINUED | OUTPATIENT
Start: 2018-10-04 | End: 2018-10-04

## 2018-10-04 RX ORDER — LIDOCAINE HYDROCHLORIDE 10 MG/ML
INJECTION, SOLUTION EPIDURAL; INFILTRATION; INTRACAUDAL; PERINEURAL AS NEEDED
Status: DISCONTINUED | OUTPATIENT
Start: 2018-10-04 | End: 2018-10-04 | Stop reason: SURG

## 2018-10-04 RX ORDER — DOCUSATE SODIUM 100 MG/1
100 CAPSULE, LIQUID FILLED ORAL 2 TIMES DAILY
Status: DISCONTINUED | OUTPATIENT
Start: 2018-10-04 | End: 2018-10-05 | Stop reason: HOSPADM

## 2018-10-04 RX ORDER — IBUPROFEN 600 MG/1
TABLET ORAL
Status: COMPLETED
Start: 2018-10-04 | End: 2018-10-04

## 2018-10-04 RX ORDER — OXYTOCIN/RINGER'S LACTATE 30/500 ML
PLASTIC BAG, INJECTION (ML) INTRAVENOUS
Status: DISPENSED
Start: 2018-10-04 | End: 2018-10-04

## 2018-10-04 RX ORDER — DIAPER,BRIEF,INFANT-TODD,DISP
1 EACH MISCELLANEOUS 4 TIMES DAILY PRN
Status: DISCONTINUED | OUTPATIENT
Start: 2018-10-04 | End: 2018-10-05 | Stop reason: HOSPADM

## 2018-10-04 RX ADMIN — FAMOTIDINE 20 MG: 20 TABLET, FILM COATED ORAL at 18:39

## 2018-10-04 RX ADMIN — IBUPROFEN 600 MG: 600 TABLET, FILM COATED ORAL at 22:54

## 2018-10-04 RX ADMIN — HYDROCORTISONE 1 APPLICATION: 1 CREAM TOPICAL at 11:00

## 2018-10-04 RX ADMIN — ROPIVACAINE HYDROCHLORIDE 6 ML: 2 INJECTION, SOLUTION EPIDURAL; INFILTRATION at 01:12

## 2018-10-04 RX ADMIN — IBUPROFEN 600 MG: 600 TABLET, FILM COATED ORAL at 16:46

## 2018-10-04 RX ADMIN — DOCUSATE SODIUM 100 MG: 100 CAPSULE, LIQUID FILLED ORAL at 16:47

## 2018-10-04 RX ADMIN — ROPIVACAINE HYDROCHLORIDE 6 ML: 2 INJECTION, SOLUTION EPIDURAL; INFILTRATION at 01:15

## 2018-10-04 RX ADMIN — IBUPROFEN 600 MG: 600 TABLET, FILM COATED ORAL at 04:07

## 2018-10-04 RX ADMIN — LIDOCAINE HYDROCHLORIDE 5 ML: 10 INJECTION, SOLUTION EPIDURAL; INFILTRATION; INTRACAUDAL; PERINEURAL at 01:24

## 2018-10-04 RX ADMIN — ACETAMINOPHEN 650 MG: 325 TABLET, FILM COATED ORAL at 09:09

## 2018-10-04 RX ADMIN — WITCH HAZEL 1 PAD: 500 SOLUTION RECTAL; TOPICAL at 12:45

## 2018-10-04 RX ADMIN — BENZOCAINE AND LEVOMENTHOL: 200; 5 SPRAY TOPICAL at 10:59

## 2018-10-04 RX ADMIN — LIDOCAINE HYDROCHLORIDE AND EPINEPHRINE 3 ML: 15; 5 INJECTION, SOLUTION EPIDURAL at 01:11

## 2018-10-04 RX ADMIN — ROPIVACAINE HYDROCHLORIDE 3 ML: 2 INJECTION, SOLUTION EPIDURAL; INFILTRATION at 01:20

## 2018-10-04 RX ADMIN — IBUPROFEN 600 MG: 600 TABLET, FILM COATED ORAL at 10:34

## 2018-10-04 RX ADMIN — DOCUSATE SODIUM 100 MG: 100 CAPSULE, LIQUID FILLED ORAL at 09:10

## 2018-10-04 RX ADMIN — FENTANYL CITRATE 50 MCG: 50 INJECTION, SOLUTION INTRAMUSCULAR; INTRAVENOUS at 01:12

## 2018-10-04 NOTE — ANESTHESIA POSTPROCEDURE EVALUATION
Post-Op Assessment Note      CV Status:  Stable    Mental Status:  Alert and awake    Hydration Status:  Euvolemic    PONV Controlled:  Controlled    Airway Patency:  Patent    Post Op Vitals Reviewed: Yes          Staff: Anesthesiologist     Post-op block assessment: catheter intact and no complications      /08  HR 80

## 2018-10-04 NOTE — ANESTHESIA PROCEDURE NOTES
Epidural Block    Patient location during procedure: OB  Start time: 10/4/2018 1:00 AM  Reason for block: procedure for pain  Staffing  Anesthesiologist: Jens Slaughter  Performed: anesthesiologist   Preanesthetic Checklist  Completed: patient identified, surgical consent, pre-op evaluation, timeout performed, IV checked, risks and benefits discussed and monitors and equipment checked  Epidural  Patient position: sitting  Prep: Betadine and site prepped and draped  Patient monitoring: heart rate, continuous pulse ox and frequent blood pressure checks  Approach: midline  Location: lumbar (1-5) (L4/5)  Injection technique: ETTA air  Needle  Needle type: Tuohy   Epidural needle gauge: 17 g  Catheter type: side hole  Catheter size: 19 g springwound  Catheter at skin depth: 15 cm  Test dose: negative and lidocaine 1 5% with epinephrine 1-to-200,000  Assessment  Events: blood aspiratednegative aspiration for CSF, negative aspiration for heme and no paresthesia on injection  patient tolerated the procedure well with no immediate complications  Additional Notes  Pt positioned sitting, timeout, sterile prep and drape  Placement x 2 levels with ETTA to air  First attempt L3/4, placed after 3 redirections, attempted CSE but no CSF  Cath threaded easily but aspirated blood  Removed, replaced L4/5 with difficulty after numerous redirections  Again no CSF with spinal attempt but strong right parasthesia  Cath threaded easily, negative aspiration and test, catheter dosed incrementally  Patient laid supine with CAN, PCEA initiated, pt ready to push  + relief once pushing

## 2018-10-04 NOTE — L&D DELIVERY NOTE
DELIVERY NOTE  Valery Singh 25 y o  female MRN: [id number]  Unit/Bed#: -01 Encounter: [id number]    Obstetrician:   Kenyatta    Assistant: None    Pre-Delivery Diagnosis: Term pregnancy  Spontaneous labor  Single fetus    Post-Delivery Diagnosis: Same as above - Delivered  Viable female , Apgars 9/10    Procedure: Spontaneous vaginal delivery    Estimated Blood Loss:  200            Anesthesia: epidural, local anesthesia for repair    Complications:  None    Specimens: cord blood, arterial and venous cord gases, placenta to storage    Description of Delivery:     Patient delivered a viable Female  over intact perineum and/or 2nd degree laceration  A nuchal cord was not noted  With the assistance of maternal expulsive efforts and downward traction of fetal head, the anterior shoulder was delivered without difficulty, followed by the remainder of the infant's body  The infant was then placed on the mothers abdomen  A nurse resuscitator was present at bedside to assess the   The umbilical cord was then doubly clamped and cut  Umbilical cord blood and umbilical artery and venous gases were collected  Active management of the third stage of labor was undertaken with IV pitocin and massage  Placenta was delivered with fundal massage and gentle traction on the cord with active management of the third stage of labor  A portion of cord was kept for storage for future tissue study if required  Placenta delivered intact with a 3-vessel cord  Bleeding was noted to be under control  Inspection of the vagina, cervix, perineum and rectum was performed  A 2nd degree was identified which was then repaired in standard fashion with 3-0 Vicryl rapid  The laceration(s) showed good tissue reapproximation and hemostasis  Mother and baby are currently recovering nicely in stable condition  I was present and participated in key portions of the entire procedure      Recent Results (from the past 1 hour(s))   Blood gas, arterial, cord    Collection Time: 10/04/18  1:36 AM   Result Value Ref Range    pH, Cord Art 7 343 7 230 - 7 430    pCO2, Cord Art 40 5 30 0 - 60 0    pO2, Cord Art 29 0 (H) 5 0 - 25 0 mm HG    HCO3, Cord Art 21 5 17 3 - 27 3 mmol/L    Base Exc, Cord Art -3 9 (L) 3 0 - 11 0 mmol/L    O2 Content, Cord Art 15 5 ml/dl    O2 Hgb, Arterial Cord 66 5 %

## 2018-10-04 NOTE — ANESTHESIA PREPROCEDURE EVALUATION
Review of Systems/Medical History  Patient summary reviewed  Chart reviewed  No history of anesthetic complications (prior epidural, no problems)     Cardiovascular  Negative cardio ROS    Pulmonary  Negative pulmonary ROS        GI/Hepatic    GERD ,        Negative  ROS        Endo/Other  Negative endo/other ROS      GYN  Currently pregnant , Prior pregnancy/OB history : 3 Parity: 1,          Hematology  Negative hematology ROS      Musculoskeletal  Negative musculoskeletal ROS        Neurology  Negative neurology ROS      Psychology   Negative psychology ROS            Physical Exam    Airway    Mallampati score: II  TM Distance: >3 FB  Neck ROM: full     Dental   No notable dental hx     Cardiovascular  Comment: Negative ROS,     Pulmonary      Other Findings      Lab Results   Component Value Date    HGB 11 1 (L) 2018     2018     Anesthesia Plan  ASA Score- 2     Anesthesia Type- epidural and spinal with ASA Monitors  Additional Monitors:   Airway Plan:         Plan Factors-    Induction-     Postoperative Plan-     Informed Consent- Anesthetic plan and risks discussed with patient and spouse

## 2018-10-04 NOTE — DISCHARGE INSTRUCTIONS
Vaginal Delivery   WHAT YOU SHOULD KNOW:   A vaginal delivery is the birth of your baby through your vagina (birth canal)  AFTER YOU LEAVE:   Medicines:  · NSAIDs  help decrease swelling and pain or fever  This medicine is available with or without a doctor's order  NSAIDs can cause stomach bleeding or kidney problems in certain people  If you take blood thinner medicine, always ask your healthcare provider if NSAIDs are safe for you  Always read the medicine label and follow directions  · Take your medicine as directed  Call your healthcare provider if you think your medicine is not helping or if you have side effects  Tell him if you are allergic to any medicine  Keep a list of the medicines, vitamins, and herbs you take  Include the amounts, and when and why you take them  Bring the list or the pill bottles to follow-up visits  Carry your medicine list with you in case of an emergency  Follow up with your primary healthcare provider:  Most women need to return 6 weeks after a vaginal delivery  Ask about how to care for your wounds or stitches  Write down your questions so you remember to ask them during your visits  Activity:  Rest as much as possible  Try to keep all activities short  You may be able to do some exercise soon after you have your baby  Talk with your primary healthcare provider before you start exercising  If you work outside the home, ask when you can return to your job  Kegel exercises:  Kegel exercises may help your vaginal and rectal muscles heal faster  You can do Kegel exercises by tightening and relaxing the muscles around your vagina  Kegel exercises help make the muscles stronger  Breast care:  When your milk comes in, your breasts may feel full and hard  Ask how to care for your breasts, even if you are not breastfeeding  Constipation:  Do not try to push the bowel movement out if it is too hard   High-fiber foods, extra liquids, and regular exercise can help you prevent constipation  Examples of high-fiber foods are fruit and bran  Prune juice and water are good liquids to drink  Regular exercise helps your digestive system work  You may also be told to take over-the-counter fiber and stool softener medicines  Take these items as directed  Hemorrhoids:  Pregnancy can cause severe hemorrhoids  You may have rectal pain because of the hemorrhoids  Ask how to prevent or treat hemorrhoids  Perineum care: Your perineum is the area between your vagina and anus  Keep the area clean and dry to help it heal and to prevent infection  Wash the area gently with soap and water when you bathe or shower  Rinse your perineum with warm water when you use the toilet  Your primary healthcare provider may suggest you use a warm sitz bath to help decrease pain  A sitz bath is a bathtub or basin filled to hip level  Stay in the sitz bath for 20 to 30 minutes, or as directed  Vaginal discharge: You will have vaginal discharge, called lochia, after your delivery  The lochia is bright red the first day or two after the birth  By the fourth day, the amount decreases, and it turns red-brown  Use a sanitary pad rather than a tampon to prevent a vaginal infection  It is normal to have lochia up to 8 weeks after your baby is born  Monthly periods: Your period may start again within 7 to 12 weeks after your baby is born  If you are breastfeeding, it may take longer for your period to start again  You can still get pregnant again even though you do not have your monthly period  Talk with your primary healthcare provider about a birth control method that will be good for you if you do not want to get pregnant  Mood changes: Many new mothers have some kind of mood changes after delivery  Some of these changes occur because of lack of sleep, hormone changes, and caring for a new baby  Some mood changes can be more serious, such as postpartum depression   Talk with your primary healthcare provider if you feel unable to care for yourself or your baby  Sexual activity:  You may need to avoid sex for 6 to 7 weeks after you have your baby  You may notice you have a decreased desire for sex, or sex may be painful  You may need to use a vaginal lubricant (gel) to help make sex more comfortable  Contact your primary healthcare provider if:   · You have heavy vaginal bleeding that fills 1 or more sanitary pads in 1 hour  · You have a fever  · Your pain does not go away, or gets worse  · The skin between your vagina and rectum is swollen, warm, or red  · You have swollen, hard, or painful breasts  · You feel very sad or depressed  · You feel more tired than usual      · You have questions or concerns about your condition or care  Seek care immediately or call 911 if:   · You have pus or yellow drainage coming from your vagina or wound  · You are urinating very little, or not at all  · Your arm or leg feels warm, tender, and painful  It may look swollen and red  · You feel lightheaded, have sudden and worsening chest pain, or trouble breathing  You may have more pain when you take deep breaths or cough, or you may cough up blood  © 2014 [address] is for End User's use only and may not be sold, redistributed or otherwise used for commercial purposes  All illustrations and images included in CareNotes® are the copyrighted property of [employer] , [employer]  or Steve Ramos  The above information is an  only  It is not intended as medical advice for individual conditions or treatments  Talk to your doctor, nurse or pharmacist before following any medical regimen to see if it is safe and effective for you

## 2018-10-04 NOTE — H&P
H&P Exam - Obstetrics   Quirino Scott 25 y o  female MRN: [id number]  Unit/Bed#: LD Triage 2- Encounter: [id number]    Assessment/Plan     Assessment:  28yo  @ 39 weeks in labor    Plan:  Admit for labor  Anticipate   Epidural upon request    History of Present Illness   Chief Complaint: Active labor    HPI:  Quirino Scott is a 25 y o   female with an BONITA of 10/10/2018, by Ultrasound at 39w0d weeks gestation who is being admitted for Active labor  Her current obstetrical history is significant for n/a  Contractions: Date/time of onset: 10/3/2018 @ 1900, Frequency: Every 3-5 minutes, Duration: 60 seconds and Intensity: moderate  Leakage of fluid: None  Bleeding: scant pink discharge  Fetal movement: present  Pregnancy complications: none  Review of Systems   Gastrointestinal: Negative for constipation and diarrhea  Genitourinary: Positive for pelvic pain and vaginal discharge         Historical Information   OB History    Para Term  AB Living   3 1 1 0 1 1   SAB TAB Ectopic Multiple Live Births   1 0 0 0 1      # Outcome Date GA Lbr Misha/2nd Weight Sex Delivery Anes PTL Lv   3 Current            2 SAB 17     SAB      1 Term 14 39w6d   M Vag-Spont EPI N ZEN        Baby complications/comments: none known  Past Medical History:   Diagnosis Date    Pregnancy with uncertain fetal viability     Last assessed - 17    Spontaneous      Varicella     childhood     Past Surgical History:   Procedure Laterality Date    DENTAL SURGERY      WISDOM TOOTH EXTRACTION       Social History   History   Alcohol Use    Yes     Comment: social pre-pregnancy     History   Drug Use No     History   Smoking Status    Never Smoker   Smokeless Tobacco    Never Used     Family History: non-contributory    Meds/Allergies   all medications and allergies reviewed  Allergies   Allergen Reactions    Other      enviornmental       Objective   Vitals: Last menstrual period 12/26/2017, currently breastfeeding  There is no height or weight on file to calculate BMI  Invasive Devices          No matching active lines, drains, or airways          Physical Exam   Constitutional: She is oriented to person, place, and time  She appears well-developed and well-nourished  Cardiovascular: Normal rate, regular rhythm and normal heart sounds  No murmur heard  Pulmonary/Chest: Effort normal and breath sounds normal  No respiratory distress  She has no wheezes  Abdominal: Soft  She exhibits distension (gravid)  Musculoskeletal: She exhibits no edema  Neurological: She is alert and oriented to person, place, and time  Skin: Skin is warm  Psychiatric: She has a normal mood and affect  Vitals reviewed  Prenatal Labs: I have personally reviewed pertinent reports  , Blood Type:   Lab Results   Component Value Date/Time    ABO Grouping A 07/25/2018 08:31 AM     , D (Rh type):   Lab Results   Component Value Date/Time    Rh Factor Negative 07/25/2018 08:31 AM     , Antibody Screen:   Lab Results   Component Value Date/Time    Antibody Screen Negative 07/25/2018 08:31 AM    , 1 hour Glucola:   Lab Results   Component Value Date/Time    Glucose 79 07/25/2018 08:31 AM   , Rubella:   Lab Results   Component Value Date/Time    Rubella IgG Quant 38 2 03/15/2018 10:31 AM        , VDRL/RPR:   Lab Results   Component Value Date/Time    RPR Non-Reactive 07/25/2018 08:31 AM      , Hep B:   Lab Results   Component Value Date/Time    Hepatitis B Surface Ag Non-reactive 03/15/2018 10:31 AM     , HIV:   Lab Results   Component Value Date/Time    HIV-1/HIV-2 Ab Non-Reactive 03/15/2018 10:31 AM     , Group B Strep:    Lab Results   Component Value Date/Time    Strep Grp B PCR Negative for Beta Hemolytic Strep Grp B by PCR 09/12/2018 01:26 PM          Imaging, EKG, Pathology, and Other Studies: I have personally reviewed pertinent reports

## 2018-10-04 NOTE — DISCHARGE SUMMARY
Discharge Summary - OB/GYN   Milagro Starkey 25 y o  female MRN: [id number]  Unit/Bed#: -01 Encounter: [id number]      Admission Date: 10/3/2018     Discharge Date: 10/5/2018    Admitting Diagnosis:   1  Pregnancy at 39w1d    Discharge Diagnosis:   Same, delivered    Procedures: spontaneous vaginal delivery    Attending: Rick Lawson MD    Hospital Course:     Milagro Starkey is a 25 y o  [id number] at 39w1d wks who was initially admitted for labor  She was found to be 5-6 on admission and was made comfortable with an epidural  She progressed quickly to complete dilation and began pushing  She delivered a viable female  on 10/4/18 at 36  Weight 7lbs 2 5oz via spontaneous vaginal delivery  Apgars were 9 (1 min) and 10 (5 min)  Patient tolerated the procedure well and was transferred to postpartum in stable condition  Her postpartum course was uncomplicated  Her pospartum pain was well controlled with oral analgesics  On day of discharge, she was ambulating and able to reasonably perform all ADLs  She was voiding and had appropriate bowel function  Pain was well controlled  She was discharged home on postpartum day #2 without complications  Patient was instructed to follow up with her OB as an outpatient and was given appropriate warnings to call provider if she develops signs of infection or uncontrolled pain  Complications: none apparent    Condition at discharge: good     Discharge instructions/Information to patient and family:   See after visit summary for information provided to patient and family  Provisions for Follow-Up Care:  See after visit summary for information related to follow-up care and any pertinent home health orders  Disposition: Home    Planned Readmission: No    Discharge Medications: For a complete list of the patient's medications, please refer to her med rec

## 2018-10-04 NOTE — LACTATION NOTE
This note was copied from a baby's chart  Sandra Dutton says latching is going well  Feeding not observed  Sandra Dutton expressed that Lisa Escobar has trouble staying awake for the feedings  Reviewed optimum positioning to improve depth of latch and wakefulness at feeding times  Sandra Dutton declined breastfeeding introduction packet

## 2018-10-05 ENCOUNTER — TRANSITIONAL CARE MANAGEMENT (OUTPATIENT)
Dept: FAMILY MEDICINE CLINIC | Facility: CLINIC | Age: 28
End: 2018-10-05

## 2018-10-05 VITALS
TEMPERATURE: 97.6 F | HEART RATE: 70 BPM | OXYGEN SATURATION: 97 % | DIASTOLIC BLOOD PRESSURE: 84 MMHG | RESPIRATION RATE: 18 BRPM | SYSTOLIC BLOOD PRESSURE: 135 MMHG

## 2018-10-05 RX ORDER — IBUPROFEN 600 MG/1
600 TABLET ORAL EVERY 6 HOURS PRN
Qty: 30 TABLET | Refills: 0 | Status: SHIPPED | OUTPATIENT
Start: 2018-10-05 | End: 2020-01-22 | Stop reason: ALTCHOICE

## 2018-10-05 RX ADMIN — HUMAN RHO(D) IMMUNE GLOBULIN 300 MCG: 300 INJECTION, SOLUTION INTRAMUSCULAR at 09:49

## 2018-10-05 RX ADMIN — FAMOTIDINE 20 MG: 20 TABLET, FILM COATED ORAL at 09:54

## 2018-10-05 RX ADMIN — DOCUSATE SODIUM 100 MG: 100 CAPSULE, LIQUID FILLED ORAL at 09:54

## 2018-10-05 NOTE — CASE MANAGEMENT
Notification of Birth and  Information  This is a Notification of Birth and [place] Information to our facility [address] [place] [address]  Please be advised that this patient is currently in our facility under Inpatient Status  Below you will find the Birth/ Summary, Attending Physician and Facilitys information including NPI#  and contact information for the Utilization Review Department where the patient is receiving care services  Facility: [address] [place] [address])  Address: [address] [place] [address], [address] [place] [address]  Phone: [phone] Tax ID: [id number]  NPI: [id number]  Medicare ID: [id number]  Place of Service Code: 24   Place of Service Name: Inpatient Hospital  Presentation Date & Time: 10/3/2018 11:35 PM  Inpatient Admission Date & Time: 10/3/18 2358  Discharge Date & Time: 10/5/2018 12:03 PM   Discharge Disposition (if discharged): Home/Self Care  Attending Physician & NPI: Brandon Dixon, [id number]  Specialty- Obstetrics and Gynecology  [place] [address] [id number]  [address] [place] [address], [address] [place] [address]  Phone 1: [phone]  Fax: [phone]  Mother of [place] Information: Reji Boss   MRN: [id number] YOB: 1990   Estimated Date of Delivery: 10/10/18  Type of Delivery: Vaginal, Spontaneous Delivery    Delivering clinician: Leticia You   OB History      Para Term  AB Living    3 2 2 0 1 2    SAB TAB Ectopic Multiple Live Births    1 0 0 0 2         Name & MRN:   Information for the patient's :  Corrie Santacruz Girl  Merry Sharma) [[id number]]     [place] Delivery Information:  Sex: female  Delivered 10/4/2018 1:32 AM by Vaginal, Spontaneous Delivery; Gestational Age: 36w3d     Measurements:  Weight: 7 lb 2 5 oz (3245 g);   Height: 19 5"    APGAR 1 minute 5 minutes 10 minutes   Totals: 9 10      Thank you,  [facility] Utilization Review Department  Phone: [phone]; Fax [phone]  ATTENTION: Please call with any questions or concerns to [phone]  and carefully follow the prompts so that you are directed to the right person  Send all requests for admission clinical reviews, approved or denied determinations and any other requests to fax [phone]   All voicemails are confidential

## 2018-10-05 NOTE — PROGRESS NOTES
Progress Note - OB/GYN   Dennys Salgado 25 y o  female MRN: [id number]  Unit/Bed#: 3 335-01 Encounter: [id number]      Subjective/Objective   Chief Complaint:      PP/POD#1 s/p Spontaneous Vaginal Delivery    Subjective:     Pain: yes, mild  Tolerating PO: yes  Voiding: yes  Flatus: yes  BM: no  Ambulating: yes  Breastfeeding: Breastfeeding  Chest pain: no  Shortness of breath: no  Leg pain: no  Lochia: minimul    Objective:     Vitals: Blood pressure 126/91, pulse 64, temperature 98 °F (36 7 °C), temperature source Oral, resp  rate 18, last menstrual period 2017, SpO2 97 %, currently breastfeeding  Intake/Output Summary (Last 24 hours) at 10/05/18 0645  Last data filed at 10/04/18 1030   Gross per 24 hour   Intake                0 ml   Output              500 ml   Net             -500 ml       Physical Exam:     General:  AAOx3, NAD  Cardiovascular: RRR, no murmurs  Respiratory: CTA b/l, no WRR  Abdomen: Soft, non-tender, non-distended, no rebound or guarding   Uterine fundus firm and non-tender, 1 cm below the umbilicus umbilicus  LE: Non tender, negative homens bilaterally    Lab, Imaging and other studies: I have personally reviewed pertinent reports  Lab Results   Component Value Date    WBC 11 91 (H) 10/04/2018    HGB 11 3 (L) 10/04/2018    HCT 34 3 (L) 10/04/2018    MCV 81 (L) 10/04/2018     10/04/2018                 Assessment:  PP/POD#1 s/p Spontaneous Vaginal Delivery, doing well     Plan:  Routine postpartum care  Anticipate Discharge to home today  Follow up in office in 3 weeks

## 2018-10-08 DIAGNOSIS — O22.40: Primary | ICD-10-CM

## 2018-10-08 RX ORDER — DOCUSATE SODIUM 100 MG/1
100 CAPSULE, LIQUID FILLED ORAL 2 TIMES DAILY
Qty: 60 CAPSULE | Refills: 2 | Status: SHIPPED | OUTPATIENT
Start: 2018-10-08 | End: 2020-01-22 | Stop reason: ALTCHOICE

## 2018-10-23 LAB — PLACENTA IN STORAGE: NORMAL

## 2018-12-10 DIAGNOSIS — Z30.011 ORAL CONTRACEPTION INITIAL PRESCRIPTION: Primary | ICD-10-CM

## 2018-12-10 DIAGNOSIS — Z30.41 SURVEILLANCE FOR BIRTH CONTROL, ORAL CONTRACEPTIVES: ICD-10-CM

## 2018-12-10 RX ORDER — NORGESTIMATE AND ETHINYL ESTRADIOL 0.25-0.035
1 KIT ORAL DAILY
Qty: 84 TABLET | Refills: 3 | Status: SHIPPED | OUTPATIENT
Start: 2018-12-10 | End: 2020-01-22 | Stop reason: ALTCHOICE

## 2018-12-10 RX ORDER — NORGESTIMATE AND ETHINYL ESTRADIOL 0.25-0.035
1 KIT ORAL DAILY
Qty: 28 TABLET | Refills: 0 | Status: SHIPPED | OUTPATIENT
Start: 2018-12-10 | End: 2020-01-22 | Stop reason: ALTCHOICE

## 2019-03-11 PROCEDURE — 88341 IMHCHEM/IMCYTCHM EA ADD ANTB: CPT | Performed by: PATHOLOGY

## 2019-03-11 PROCEDURE — 88305 TISSUE EXAM BY PATHOLOGIST: CPT | Performed by: PATHOLOGY

## 2019-03-11 PROCEDURE — 88342 IMHCHEM/IMCYTCHM 1ST ANTB: CPT | Performed by: PATHOLOGY

## 2019-03-13 ENCOUNTER — LAB REQUISITION (OUTPATIENT)
Dept: LAB | Facility: HOSPITAL | Age: 29
End: 2019-03-13
Payer: COMMERCIAL

## 2019-03-13 DIAGNOSIS — D22.21 MELANOCYTIC NEVI OF RIGHT EAR: ICD-10-CM

## 2019-04-08 DIAGNOSIS — IMO0001 CONTRACEPTION: Primary | ICD-10-CM

## 2019-04-08 RX ORDER — NORGESTIMATE AND ETHINYL ESTRADIOL 0.25-0.035
1 KIT ORAL DAILY
Qty: 28 TABLET | Refills: 0 | Status: SHIPPED | OUTPATIENT
Start: 2019-04-08 | End: 2020-01-22 | Stop reason: ALTCHOICE

## 2019-04-30 PROCEDURE — 88304 TISSUE EXAM BY PATHOLOGIST: CPT | Performed by: PATHOLOGY

## 2019-05-02 ENCOUNTER — LAB REQUISITION (OUTPATIENT)
Dept: LAB | Facility: HOSPITAL | Age: 29
End: 2019-05-02
Payer: COMMERCIAL

## 2019-05-02 DIAGNOSIS — L72.0 EPIDERMAL CYST: ICD-10-CM

## 2019-12-12 DIAGNOSIS — Z34.91 ENCOUNTER FOR PREGNANCY RELATED EXAMINATION IN FIRST TRIMESTER: Primary | ICD-10-CM

## 2020-01-22 ENCOUNTER — ROUTINE PRENATAL (OUTPATIENT)
Dept: PERINATAL CARE | Facility: CLINIC | Age: 30
End: 2020-01-22
Payer: COMMERCIAL

## 2020-01-22 VITALS
HEART RATE: 86 BPM | WEIGHT: 200.6 LBS | HEIGHT: 66 IN | SYSTOLIC BLOOD PRESSURE: 130 MMHG | BODY MASS INDEX: 32.24 KG/M2 | DIASTOLIC BLOOD PRESSURE: 84 MMHG

## 2020-01-22 DIAGNOSIS — Z34.91 ENCOUNTER FOR PREGNANCY RELATED EXAMINATION IN FIRST TRIMESTER: ICD-10-CM

## 2020-01-22 DIAGNOSIS — Z36.82 NUCHAL TRANSLUCENCY OF FETUS ON PRENATAL ULTRASOUND: Primary | ICD-10-CM

## 2020-01-22 DIAGNOSIS — Z3A.13 13 WEEKS GESTATION OF PREGNANCY: ICD-10-CM

## 2020-01-22 PROCEDURE — 76813 OB US NUCHAL MEAS 1 GEST: CPT | Performed by: OBSTETRICS & GYNECOLOGY

## 2020-01-22 NOTE — PROGRESS NOTES
Please refer to the [facility] ultrasound report in Ob Procedures for additional information regarding the visit to the [facility]  today    Susan Bravo MD

## 2020-01-22 NOTE — LETTER
January 22, 2020     Ileana Kuhn MD  [address]    Patient: Tim Hardy   YOB: 1990   Date of Visit: 1/22/2020     Dear Dr Cyndi Rivas      Thank you for referring Tim Hardy to me for evaluation  Below are the relevant portions of my assessment and plan of care  If you have questions, please do not hesitate to call me  I look forward to following Cori along with you           Sincerely,        Kishor Gregorio MD        CC: No Recipients    Progress Notes:

## 2020-01-23 ENCOUNTER — INITIAL PRENATAL (OUTPATIENT)
Dept: OBGYN CLINIC | Facility: MEDICAL CENTER | Age: 30
End: 2020-01-23

## 2020-01-23 DIAGNOSIS — Z34.91 ENCOUNTER FOR PREGNANCY RELATED EXAMINATION IN FIRST TRIMESTER: Primary | ICD-10-CM

## 2020-01-23 PROCEDURE — OBC: Performed by: OBSTETRICS & GYNECOLOGY

## 2020-01-23 NOTE — PROGRESS NOTES
OB INTAKE INTERVIEW      Pt presents for OB intake  [id number]  OB History    Para Term  AB Living   4 2 2 0 1 2   SAB TAB Ectopic Multiple Live Births   1 0 0 0 2      # Outcome Date GA Lbr Misha/2nd Weight Sex Delivery Anes PTL Lv   4 Current            3 Term 10/04/18 39w1d 06:25 / 00:07 3245 g (7 lb 2 5 oz) F Vag-Spont EPI, Local N ZEN   2 SAB 17     SAB      1 Term 14 39w6d  3459 g (7 lb 10 oz) M Vag-Spont EPI N ZEN         Hx of  delivery prior to 36 weeks 6 days:  NO     Last Menstrual Period:   Patient's last menstrual period was 10/15/2019  Estimated date of delivery:   Estimated Date of Delivery: 20  confirmed by  [address]  ? History of Diabetes: NO  History of Hypertension: NO      Infection Screening: Does the pt have a hx of MRSA? NO        ? Interview education  Information on [facility] Pregnancy Essentials reviewed  Handouts given: Baby and Me phone reyna guide  Baby and Me support center  [facility] in Pregnancy information sheet   [facility]  Discussed genetic testing-    - pt interested part 1 of Seq  Screen completed  CF and SMA carrier screening completed with previous pregnancy with negative result           Depression Screening Follow-up Plan: Patient's depression screening was Negative  with an [place] score of  o       Will complete PN panel at lab           The patient was oriented to our practice and all questions were answered    Interviewed by: Shawn Banegas RN 20

## 2020-01-29 ENCOUNTER — TELEPHONE (OUTPATIENT)
Dept: PERINATAL CARE | Facility: CLINIC | Age: 30
End: 2020-01-29

## 2020-01-29 NOTE — LETTER
01/29/20  Sarah Hayes  1990    Thank you for completing Part 1 of your Sequential Screen  To obtain a complete test result, please complete blood work for Part 2 Sequential Screen between the weeks of 2/8/2020 to 2/22/2020  Based on your insurance coverage, please use one of the following locations  Call our office for any questions at [phone]      [address] [place] [address] Avenue  14[address] [facility], [facility], [address]    [address] [facility], [place], [facility] [place] [address]       Phone: [phone]      Phone: [phone]    [address]  [address], [address]  Phone: [phone]      Phone: [phone] [facility])    [address], [facility], [facility]   700 [facility], [place] [address] [address]  Phone: [phone]      Phone:  [phone]  Hours: Monday-Friday 6a-6p, Saturday 7a-12    [address]  1401 [facility] 6   [address]  Phone: [phone]      Phone:  [facility]  207 [facility], [facility], [address]   819 [facility], [address]  Phone: [phone]      Phone: [address]  1430 [facility], [address]  38  [phone]    Sincerely,    Perla Sterling RN

## 2020-01-29 NOTE — TELEPHONE ENCOUNTER
Left VMM on # on pts communication consent with results of part 1 Sequential Screen  Part 2 explained, instructed to contact office with questions  TRF mailed

## 2020-02-05 ENCOUNTER — TELEPHONE (OUTPATIENT)
Dept: OBGYN CLINIC | Facility: MEDICAL CENTER | Age: 30
End: 2020-02-05

## 2020-02-05 NOTE — TELEPHONE ENCOUNTER
Per [employer] precert is required for global maternity  Form completed and faxed with POP letter  All prenatal visits will be covered at 100% not subject to deductible  All inpatient services will be covered at 100% after deductible is met

## 2020-02-18 ENCOUNTER — INITIAL PRENATAL (OUTPATIENT)
Dept: OBGYN CLINIC | Facility: MEDICAL CENTER | Age: 30
End: 2020-02-18
Payer: COMMERCIAL

## 2020-02-18 VITALS — BODY MASS INDEX: 32.6 KG/M2 | DIASTOLIC BLOOD PRESSURE: 60 MMHG | WEIGHT: 202 LBS | SYSTOLIC BLOOD PRESSURE: 126 MMHG

## 2020-02-18 DIAGNOSIS — Z3A.17 17 WEEKS GESTATION OF PREGNANCY: ICD-10-CM

## 2020-02-18 DIAGNOSIS — Z34.82 ENCOUNTER FOR SUPERVISION OF OTHER NORMAL PREGNANCY IN SECOND TRIMESTER: Primary | ICD-10-CM

## 2020-02-18 LAB
ABO GROUP BLD: NORMAL
BASOPHILS # BLD AUTO: 0.06 THOUSANDS/ΜL (ref 0–0.1)
BASOPHILS NFR BLD AUTO: 1 % (ref 0–1)
BILIRUB UR QL STRIP: NEGATIVE
BLD GP AB SCN SERPL QL: NEGATIVE
CLARITY UR: CLEAR
COLOR UR: YELLOW
EOSINOPHIL # BLD AUTO: 0.06 THOUSAND/ΜL (ref 0–0.61)
EOSINOPHIL NFR BLD AUTO: 1 % (ref 0–6)
ERYTHROCYTE [DISTWIDTH] IN BLOOD BY AUTOMATED COUNT: 13.4 % (ref 11.6–15.1)
GLUCOSE UR STRIP-MCNC: NEGATIVE MG/DL
HBV SURFACE AG SER QL: NORMAL
HCT VFR BLD AUTO: 39.9 % (ref 34.8–46.1)
HGB BLD-MCNC: 12.9 G/DL (ref 11.5–15.4)
HGB UR QL STRIP.AUTO: NEGATIVE
IMM GRANULOCYTES # BLD AUTO: 0.07 THOUSAND/UL (ref 0–0.2)
IMM GRANULOCYTES NFR BLD AUTO: 1 % (ref 0–2)
KETONES UR STRIP-MCNC: NEGATIVE MG/DL
LEUKOCYTE ESTERASE UR QL STRIP: NEGATIVE
LYMPHOCYTES # BLD AUTO: 1.88 THOUSANDS/ΜL (ref 0.6–4.47)
LYMPHOCYTES NFR BLD AUTO: 14 % (ref 14–44)
MCH RBC QN AUTO: 28.1 PG (ref 26.8–34.3)
MCHC RBC AUTO-ENTMCNC: 32.3 G/DL (ref 31.4–37.4)
MCV RBC AUTO: 87 FL (ref 82–98)
MONOCYTES # BLD AUTO: 0.55 THOUSAND/ΜL (ref 0.17–1.22)
MONOCYTES NFR BLD AUTO: 4 % (ref 4–12)
NEUTROPHILS # BLD AUTO: 10.59 THOUSANDS/ΜL (ref 1.85–7.62)
NEUTS SEG NFR BLD AUTO: 79 % (ref 43–75)
NITRITE UR QL STRIP: NEGATIVE
NRBC BLD AUTO-RTO: 0 /100 WBCS
PH UR STRIP.AUTO: 5.5 [PH]
PLATELET # BLD AUTO: 257 THOUSANDS/UL (ref 149–390)
PMV BLD AUTO: 10.6 FL (ref 8.9–12.7)
PROT UR STRIP-MCNC: NEGATIVE MG/DL
RBC # BLD AUTO: 4.59 MILLION/UL (ref 3.81–5.12)
RH BLD: NEGATIVE
RUBV IGG SERPL IA-ACNC: 42.1 IU/ML
SP GR UR STRIP.AUTO: 1.01 (ref 1–1.03)
SPECIMEN EXPIRATION DATE: NORMAL
UROBILINOGEN UR QL STRIP.AUTO: 1 E.U./DL
WBC # BLD AUTO: 13.21 THOUSAND/UL (ref 4.31–10.16)

## 2020-02-18 PROCEDURE — 87591 N.GONORRHOEAE DNA AMP PROB: CPT | Performed by: OBSTETRICS & GYNECOLOGY

## 2020-02-18 PROCEDURE — PNV: Performed by: OBSTETRICS & GYNECOLOGY

## 2020-02-18 PROCEDURE — 87086 URINE CULTURE/COLONY COUNT: CPT | Performed by: OBSTETRICS & GYNECOLOGY

## 2020-02-18 PROCEDURE — 87491 CHLMYD TRACH DNA AMP PROBE: CPT | Performed by: OBSTETRICS & GYNECOLOGY

## 2020-02-18 PROCEDURE — 36415 COLL VENOUS BLD VENIPUNCTURE: CPT | Performed by: OBSTETRICS & GYNECOLOGY

## 2020-02-18 PROCEDURE — 80081 OBSTETRIC PANEL INC HIV TSTG: CPT | Performed by: OBSTETRICS & GYNECOLOGY

## 2020-02-18 PROCEDURE — 81003 URINALYSIS AUTO W/O SCOPE: CPT | Performed by: OBSTETRICS & GYNECOLOGY

## 2020-02-18 NOTE — PROGRESS NOTES
Norma Ureña is a 30y o  year old [id number] at 17w1d for first prenatal visit  Pregnancy was desired   She is currently taking PNV    Nausea No Vomiting No   Exam done today - see OB flowsheet  Pap done No  Gonorrhea and Chlamydia sent  Labs reviewed    Genetic testing Sequential screen    OB complications None

## 2020-02-19 LAB
BACTERIA UR CULT: NORMAL
C TRACH DNA SPEC QL NAA+PROBE: NEGATIVE
HIV 1+2 AB+HIV1 P24 AG SERPL QL IA: NORMAL
N GONORRHOEA DNA SPEC QL NAA+PROBE: NEGATIVE
RPR SER QL: NORMAL

## 2020-02-25 ENCOUNTER — TELEPHONE (OUTPATIENT)
Dept: PERINATAL CARE | Facility: CLINIC | Age: 30
End: 2020-02-25

## 2020-02-25 NOTE — TELEPHONE ENCOUNTER
----- Message from Alena Mead MD sent at 2/24/2020 11:46 PM EST -----  Patient updated with her lab results through my chart

## 2020-03-11 ENCOUNTER — ROUTINE PRENATAL (OUTPATIENT)
Dept: PERINATAL CARE | Facility: CLINIC | Age: 30
End: 2020-03-11
Payer: COMMERCIAL

## 2020-03-11 VITALS
BODY MASS INDEX: 33.23 KG/M2 | HEIGHT: 66 IN | SYSTOLIC BLOOD PRESSURE: 118 MMHG | DIASTOLIC BLOOD PRESSURE: 64 MMHG | HEART RATE: 82 BPM | WEIGHT: 206.8 LBS

## 2020-03-11 DIAGNOSIS — O44.02 PLACENTA PREVIA, SECOND TRIMESTER: ICD-10-CM

## 2020-03-11 DIAGNOSIS — Z3A.20 20 WEEKS GESTATION OF PREGNANCY: ICD-10-CM

## 2020-03-11 DIAGNOSIS — O99.212 MATERNAL OBESITY, ANTEPARTUM, SECOND TRIMESTER: Primary | ICD-10-CM

## 2020-03-11 DIAGNOSIS — Z36.86 ENCOUNTER FOR ANTENATAL SCREENING FOR CERVICAL LENGTH: ICD-10-CM

## 2020-03-11 PROCEDURE — 1036F TOBACCO NON-USER: CPT | Performed by: OBSTETRICS & GYNECOLOGY

## 2020-03-11 PROCEDURE — 76817 TRANSVAGINAL US OBSTETRIC: CPT | Performed by: OBSTETRICS & GYNECOLOGY

## 2020-03-11 PROCEDURE — 76811 OB US DETAILED SNGL FETUS: CPT | Performed by: OBSTETRICS & GYNECOLOGY

## 2020-03-11 PROCEDURE — 99212 OFFICE O/P EST SF 10 MIN: CPT | Performed by: OBSTETRICS & GYNECOLOGY

## 2020-03-11 NOTE — LETTER
March 11, 2020     Linnea Ibrara MD  [address]    Patient: Janelle Soriano   YOB: 1990   Date of Visit: 3/11/2020       Dear Dr Xiao Tovar:    Thank you for referring Janelle Soriano to me for evaluation  Below are my notes for this consultation  If you have questions, please do not hesitate to call me  I look forward to following your patient along with you  Sincerely,        Sharri Fountain MD        CC: No Recipients  Sharri Fountain MD  3/11/2020 11:21 AM  Sign at close encounter  Please refer to the [facility] ultrasound report in Ob Procedures for additional information regarding the visit to the [facility]  today

## 2020-03-11 NOTE — PROGRESS NOTES
Please refer to the [facility] ultrasound report in Ob Procedures for additional information regarding the visit to the [facility], [facility]  today

## 2020-03-11 NOTE — PROGRESS NOTES
A transvaginal ultrasound was performed  Sonographer note on use of High Level Disinfection process (Trophon) for transvaginal probe #1 used, serial number [id number]   [facility]

## 2020-03-23 ENCOUNTER — TELEMEDICINE (OUTPATIENT)
Dept: FAMILY MEDICINE CLINIC | Facility: CLINIC | Age: 30
End: 2020-03-23
Payer: COMMERCIAL

## 2020-03-23 DIAGNOSIS — Z20.828 EXPOSURE TO SARS-ASSOCIATED CORONAVIRUS: Primary | ICD-10-CM

## 2020-03-23 PROCEDURE — 99213 OFFICE O/P EST LOW 20 MIN: CPT | Performed by: PHYSICIAN ASSISTANT

## 2020-03-23 RX ORDER — RANITIDINE HCL 75 MG
75 TABLET ORAL 2 TIMES DAILY
COMMUNITY
End: 2020-06-08 | Stop reason: ALTCHOICE

## 2020-03-23 NOTE — PROGRESS NOTES
COVID-19 Virtual Visit     This virtual check-in was done via FaceTime  Encounter provider Sonali Roldan PA-C    Provider located at [facility] [id number] [facility] Bharat Maciel  [facility] [address] [facility]  [phone]    Recent Visits  No visits were found meeting these conditions  Showing recent visits within past 7 days and meeting all other requirements     Future Appointments  No visits were found meeting these conditions  Showing future appointments within next 150 days and meeting all other requirements        Patient agrees to participate in a virtual check in via telephone or video visit instead of presenting to the office to address urgent/immediate medical needs  Patient is aware this is a billable service  After connecting through telephone, the patient was identified by name and date of birth  Jordan Gimenez was informed that this was a telemedicine visit and that the exam was being conducted confidentially over secure lines  My office door was closed  No one else was in the room  Jordan Gimenez acknowledged consent and understanding of privacy and security of the telemedicine visit  I informed the patient that I have reviewed her record in Epic and presented the opportunity for her to ask any questions regarding the visit today  The patient agreed to participate  Jordan Gimenez is a 30 y o  female who is concerned about COVID-19  She reports no symptoms  She has not traveled outside the [place]  within the last 14 days    She has had contact with a person who is under investigation for or who is positive for COVID-19 within the last 14 days  She has not been hospitalized recently for fever and/or lower respiratory symptoms      Past Medical History:   Diagnosis Date    Pregnancy with uncertain fetal viability     Last assessed - 17    Spontaneous      Varicella     childhood       Past Surgical History:   Procedure Laterality Date    DENTAL SURGERY      WISDOM TOOTH EXTRACTION         Current Outpatient Medications   Medication Sig Dispense Refill    LORATADINE PO Take by mouth      Prenatal Vit-Fe Fumarate-FA (PRENATAL VITAMIN PLUS LOW IRON) 27-1 MG TABS Take by mouth      ranitidine (ZANTAC) 75 MG tablet Take 75 mg by mouth 2 (two) times a day       No current facility-administered medications for this visit  Allergies   Allergen Reactions    Other Allergic Rhinitis     enviornmental       Video Exam     Cori appears healthy  Disposition:      After clarifying the patient's history, my suspicion for COVID-19 infection is very low  I spent 15 minutes with the patient during this virtual check-in visit

## 2020-03-26 ENCOUNTER — TELEMEDICINE (OUTPATIENT)
Dept: OBGYN CLINIC | Facility: MEDICAL CENTER | Age: 30
End: 2020-03-26
Payer: COMMERCIAL

## 2020-03-26 VITALS — DIASTOLIC BLOOD PRESSURE: 72 MMHG | BODY MASS INDEX: 33.41 KG/M2 | WEIGHT: 207 LBS | SYSTOLIC BLOOD PRESSURE: 110 MMHG

## 2020-03-26 DIAGNOSIS — Z34.92 SECOND TRIMESTER PREGNANCY: Primary | ICD-10-CM

## 2020-03-26 DIAGNOSIS — O44.02 PLACENTA PREVIA, SECOND TRIMESTER: ICD-10-CM

## 2020-03-26 DIAGNOSIS — O99.212 MATERNAL OBESITY, ANTEPARTUM, SECOND TRIMESTER: ICD-10-CM

## 2020-03-26 PROCEDURE — 99213 OFFICE O/P EST LOW 20 MIN: CPT | Performed by: OBSTETRICS & GYNECOLOGY

## 2020-03-26 NOTE — PROGRESS NOTES
Virtual Regular Visit    Problem List Items Addressed This Visit        Other    Placenta previa, second trimester- repeat US scheduled at [facility] for follow up     Maternal obesity, antepartum, second trimester- 1 hr gtt at next visit       Other Visit Diagnoses     Second trimester pregnancy    -  Primary- routine care to be continued  Encouraged BP cuff to be purchased   COVID precautions                  Reason for visit is 22 week prenatal visit     Encounter provider Linda Vidal MD    Provider located at [facility]      Recent Visits  No visits were found meeting these conditions  Showing recent visits within past 7 days and meeting all other requirements     Today's Visits  Date Type Provider Dept   03/26/20 [facility] visits and meeting all other requirements     Future Appointments  Date Type Provider Dept   03/26/20 Telemedicine Linda Vidal MD [facility]   Showing future appointments within next 150 days and meeting all other requirements        After connecting through [employer], the patient was identified by name and date of birth  Greg Galicia was informed that this is a telemedicine visit and that the visit is being conducted through [employer] which may not be secure and therefore, might not be HIPAA-compliant  My office door was closed  No one else was in the room  She acknowledged consent and understanding of privacy and security of the video platform  The patient has agreed to participate and understands they can discontinue the visit at any time  Subjective  Greg Galicia is a 30 y o  female @ 22 weeks and 3 days    States feeling fetal movement / no LOF or vaginal bleeding   Taking zantac as needed     Past Medical History:   Diagnosis Date    Pregnancy with uncertain fetal viability     Last assessed - 17    Spontaneous      Varicella     childhood       Past Surgical History:   Procedure Laterality Date    DENTAL SURGERY      WISDOM TOOTH EXTRACTION         Current Outpatient Medications   Medication Sig Dispense Refill    LORATADINE PO Take by mouth      Prenatal Vit-Fe Fumarate-FA (PRENATAL VITAMIN PLUS LOW IRON) 27-1 MG TABS Take by mouth      ranitidine (ZANTAC) 75 MG tablet Take 75 mg by mouth 2 (two) times a day       No current facility-administered medications for this visit  Allergies   Allergen Reactions    Other Allergic Rhinitis     enviornmental       Review of Systems   All other systems reviewed and are negative  Physical Exam     /72  Active alert and oriented x 3  Visibly no problem breathing and no accessory muscles used   Gravid abdomen          I spent 15 minutes with the patient during this visit

## 2020-05-05 ENCOUNTER — TELEPHONE (OUTPATIENT)
Dept: PERINATAL CARE | Facility: CLINIC | Age: 30
End: 2020-05-05

## 2020-05-06 ENCOUNTER — ULTRASOUND (OUTPATIENT)
Dept: PERINATAL CARE | Facility: CLINIC | Age: 30
End: 2020-05-06
Payer: COMMERCIAL

## 2020-05-06 VITALS
BODY MASS INDEX: 35.13 KG/M2 | HEART RATE: 74 BPM | WEIGHT: 218.6 LBS | SYSTOLIC BLOOD PRESSURE: 130 MMHG | HEIGHT: 66 IN | TEMPERATURE: 95.8 F | DIASTOLIC BLOOD PRESSURE: 88 MMHG

## 2020-05-06 DIAGNOSIS — Z36.89 ENCOUNTER FOR ULTRASOUND TO CHECK FETAL GROWTH: ICD-10-CM

## 2020-05-06 DIAGNOSIS — O99.213 OBESITY AFFECTING PREGNANCY IN THIRD TRIMESTER: ICD-10-CM

## 2020-05-06 DIAGNOSIS — Z3A.28 28 WEEKS GESTATION OF PREGNANCY: ICD-10-CM

## 2020-05-06 DIAGNOSIS — O44.03 PLACENTA PREVIA, THIRD TRIMESTER: Primary | ICD-10-CM

## 2020-05-06 PROCEDURE — 76817 TRANSVAGINAL US OBSTETRIC: CPT | Performed by: OBSTETRICS & GYNECOLOGY

## 2020-05-06 PROCEDURE — 76816 OB US FOLLOW-UP PER FETUS: CPT | Performed by: OBSTETRICS & GYNECOLOGY

## 2020-05-19 ENCOUNTER — ROUTINE PRENATAL (OUTPATIENT)
Dept: OBGYN CLINIC | Facility: MEDICAL CENTER | Age: 30
End: 2020-05-19
Payer: COMMERCIAL

## 2020-05-19 VITALS — SYSTOLIC BLOOD PRESSURE: 118 MMHG | DIASTOLIC BLOOD PRESSURE: 78 MMHG | WEIGHT: 220 LBS | BODY MASS INDEX: 35.51 KG/M2

## 2020-05-19 DIAGNOSIS — Z3A.30 30 WEEKS GESTATION OF PREGNANCY: Primary | ICD-10-CM

## 2020-05-19 DIAGNOSIS — Z29.13 NEED FOR RHOGAM DUE TO RH NEGATIVE MOTHER: ICD-10-CM

## 2020-05-19 DIAGNOSIS — Z23 NEED FOR DIPHTHERIA-TETANUS-PERTUSSIS (TDAP) VACCINE: ICD-10-CM

## 2020-05-19 LAB
BASOPHILS # BLD AUTO: 0.06 THOUSANDS/ΜL (ref 0–0.1)
BASOPHILS NFR BLD AUTO: 1 % (ref 0–1)
EOSINOPHIL # BLD AUTO: 0.11 THOUSAND/ΜL (ref 0–0.61)
EOSINOPHIL NFR BLD AUTO: 1 % (ref 0–6)
ERYTHROCYTE [DISTWIDTH] IN BLOOD BY AUTOMATED COUNT: 13.2 % (ref 11.6–15.1)
GLUCOSE 1H P 50 G GLC PO SERPL-MCNC: 120 MG/DL
HCT VFR BLD AUTO: 36.1 % (ref 34.8–46.1)
HGB BLD-MCNC: 11.9 G/DL (ref 11.5–15.4)
IMM GRANULOCYTES # BLD AUTO: 0.07 THOUSAND/UL (ref 0–0.2)
IMM GRANULOCYTES NFR BLD AUTO: 1 % (ref 0–2)
LYMPHOCYTES # BLD AUTO: 2.37 THOUSANDS/ΜL (ref 0.6–4.47)
LYMPHOCYTES NFR BLD AUTO: 20 % (ref 14–44)
MCH RBC QN AUTO: 28.1 PG (ref 26.8–34.3)
MCHC RBC AUTO-ENTMCNC: 33 G/DL (ref 31.4–37.4)
MCV RBC AUTO: 85 FL (ref 82–98)
MONOCYTES # BLD AUTO: 0.74 THOUSAND/ΜL (ref 0.17–1.22)
MONOCYTES NFR BLD AUTO: 6 % (ref 4–12)
NEUTROPHILS # BLD AUTO: 8.64 THOUSANDS/ΜL (ref 1.85–7.62)
NEUTS SEG NFR BLD AUTO: 71 % (ref 43–75)
NRBC BLD AUTO-RTO: 0 /100 WBCS
PLATELET # BLD AUTO: 230 THOUSANDS/UL (ref 149–390)
PMV BLD AUTO: 10.5 FL (ref 8.9–12.7)
RBC # BLD AUTO: 4.23 MILLION/UL (ref 3.81–5.12)
WBC # BLD AUTO: 11.99 THOUSAND/UL (ref 4.31–10.16)

## 2020-05-19 PROCEDURE — 90471 IMMUNIZATION ADMIN: CPT | Performed by: OBSTETRICS & GYNECOLOGY

## 2020-05-19 PROCEDURE — 90715 TDAP VACCINE 7 YRS/> IM: CPT | Performed by: OBSTETRICS & GYNECOLOGY

## 2020-05-19 PROCEDURE — PNV: Performed by: NURSE PRACTITIONER

## 2020-05-19 PROCEDURE — 36415 COLL VENOUS BLD VENIPUNCTURE: CPT | Performed by: NURSE PRACTITIONER

## 2020-05-19 PROCEDURE — 82950 GLUCOSE TEST: CPT | Performed by: NURSE PRACTITIONER

## 2020-05-19 PROCEDURE — 85025 COMPLETE CBC W/AUTO DIFF WBC: CPT | Performed by: NURSE PRACTITIONER

## 2020-06-05 ENCOUNTER — TELEPHONE (OUTPATIENT)
Dept: OBGYN CLINIC | Facility: CLINIC | Age: 30
End: 2020-06-05

## 2020-06-08 ENCOUNTER — ROUTINE PRENATAL (OUTPATIENT)
Dept: OBGYN CLINIC | Facility: MEDICAL CENTER | Age: 30
End: 2020-06-08

## 2020-06-08 VITALS — SYSTOLIC BLOOD PRESSURE: 110 MMHG | DIASTOLIC BLOOD PRESSURE: 70 MMHG | WEIGHT: 218 LBS | BODY MASS INDEX: 35.19 KG/M2

## 2020-06-08 DIAGNOSIS — Z3A.33 33 WEEKS GESTATION OF PREGNANCY: Primary | ICD-10-CM

## 2020-06-08 DIAGNOSIS — O99.213 OBESITY AFFECTING PREGNANCY IN THIRD TRIMESTER: ICD-10-CM

## 2020-06-08 PROCEDURE — PNV: Performed by: NURSE PRACTITIONER

## 2020-06-08 RX ORDER — FAMOTIDINE 10 MG
10 TABLET ORAL
Status: ON HOLD | COMMUNITY
End: 2020-07-20

## 2020-07-02 ENCOUNTER — TELEPHONE (OUTPATIENT)
Dept: OBGYN CLINIC | Facility: MEDICAL CENTER | Age: 30
End: 2020-07-02

## 2020-07-02 ENCOUNTER — ROUTINE PRENATAL (OUTPATIENT)
Dept: OBGYN CLINIC | Facility: MEDICAL CENTER | Age: 30
End: 2020-07-02

## 2020-07-02 VITALS — WEIGHT: 222 LBS | SYSTOLIC BLOOD PRESSURE: 136 MMHG | BODY MASS INDEX: 35.83 KG/M2 | DIASTOLIC BLOOD PRESSURE: 78 MMHG

## 2020-07-02 DIAGNOSIS — Z34.93 THIRD TRIMESTER PREGNANCY: Primary | ICD-10-CM

## 2020-07-02 DIAGNOSIS — Z3A.36 36 WEEKS GESTATION OF PREGNANCY: ICD-10-CM

## 2020-07-02 PROCEDURE — 87653 STREP B DNA AMP PROBE: CPT | Performed by: OBSTETRICS & GYNECOLOGY

## 2020-07-02 PROCEDURE — PNV: Performed by: OBSTETRICS & GYNECOLOGY

## 2020-07-05 LAB — GP B STREP DNA SPEC QL NAA+PROBE: NORMAL

## 2020-07-13 ENCOUNTER — ROUTINE PRENATAL (OUTPATIENT)
Dept: OBGYN CLINIC | Facility: MEDICAL CENTER | Age: 30
End: 2020-07-13

## 2020-07-13 VITALS — DIASTOLIC BLOOD PRESSURE: 78 MMHG | BODY MASS INDEX: 36.32 KG/M2 | WEIGHT: 225 LBS | SYSTOLIC BLOOD PRESSURE: 120 MMHG

## 2020-07-13 DIAGNOSIS — Z34.83 ENCOUNTER FOR SUPERVISION OF OTHER NORMAL PREGNANCY IN THIRD TRIMESTER: Primary | ICD-10-CM

## 2020-07-13 DIAGNOSIS — Z3A.38 38 WEEKS GESTATION OF PREGNANCY: ICD-10-CM

## 2020-07-13 PROCEDURE — PNV: Performed by: OBSTETRICS & GYNECOLOGY

## 2020-07-13 NOTE — PROGRESS NOTES
Royal Cason is a 26y o  year old [id number] at 38w0d for routine prenatal visit    + FM, no vaginal bleeding, or LOF  Complaints: No   Most recent ultrasound and labs reviewed    Irregular contractions noted   Labor precautions

## 2020-07-20 ENCOUNTER — ANESTHESIA EVENT (INPATIENT)
Dept: ANESTHESIOLOGY | Facility: HOSPITAL | Age: 30
End: 2020-07-20
Payer: COMMERCIAL

## 2020-07-20 ENCOUNTER — ANESTHESIA (INPATIENT)
Dept: ANESTHESIOLOGY | Facility: HOSPITAL | Age: 30
End: 2020-07-20
Payer: COMMERCIAL

## 2020-07-20 ENCOUNTER — HOSPITAL ENCOUNTER (INPATIENT)
Facility: HOSPITAL | Age: 30
LOS: 1 days | Discharge: HOME | End: 2020-07-21
Attending: OBSTETRICS & GYNECOLOGY | Admitting: OBSTETRICS & GYNECOLOGY
Payer: COMMERCIAL

## 2020-07-20 PROBLEM — Z3A.39 39 WEEKS GESTATION OF PREGNANCY: Status: ACTIVE | Noted: 2020-05-06

## 2020-07-20 LAB
ABO GROUP BLD: NORMAL
BASE EXCESS BLDCOA CALC-SCNC: -7.9 MMOL/L (ref 3–11)
BASE EXCESS BLDCOV CALC-SCNC: -5.4 MMOL/L (ref 1–9)
BASOPHILS # BLD AUTO: 0.04 THOUSANDS/ΜL (ref 0–0.1)
BASOPHILS NFR BLD AUTO: 0 % (ref 0–1)
BLD GP AB SCN SERPL QL: NEGATIVE
EOSINOPHIL # BLD AUTO: 0.1 THOUSAND/ΜL (ref 0–0.61)
EOSINOPHIL NFR BLD AUTO: 1 % (ref 0–6)
ERYTHROCYTE [DISTWIDTH] IN BLOOD BY AUTOMATED COUNT: 13.1 % (ref 11.6–15.1)
HCO3 BLDCOA-SCNC: 21.8 MMOL/L (ref 17.3–27.3)
HCO3 BLDCOV-SCNC: 21.8 MMOL/L (ref 12.2–28.6)
HCT VFR BLD AUTO: 34.3 % (ref 34.8–46.1)
HGB BLD-MCNC: 11.2 G/DL (ref 11.5–15.4)
IMM GRANULOCYTES # BLD AUTO: 0.08 THOUSAND/UL (ref 0–0.2)
IMM GRANULOCYTES NFR BLD AUTO: 1 % (ref 0–2)
LYMPHOCYTES # BLD AUTO: 2.13 THOUSANDS/ΜL (ref 0.6–4.47)
LYMPHOCYTES NFR BLD AUTO: 18 % (ref 14–44)
MCH RBC QN AUTO: 27 PG (ref 26.8–34.3)
MCHC RBC AUTO-ENTMCNC: 32.7 G/DL (ref 31.4–37.4)
MCV RBC AUTO: 83 FL (ref 82–98)
MONOCYTES # BLD AUTO: 0.71 THOUSAND/ΜL (ref 0.17–1.22)
MONOCYTES NFR BLD AUTO: 6 % (ref 4–12)
NEUTROPHILS # BLD AUTO: 8.79 THOUSANDS/ΜL (ref 1.85–7.62)
NEUTS SEG NFR BLD AUTO: 74 % (ref 43–75)
NRBC BLD AUTO-RTO: 0 /100 WBCS
O2 CT VFR BLDCOA CALC: 5.4 ML/DL
OXYHGB MFR BLDCOA: 22.9 %
OXYHGB MFR BLDCOV: 52.9 %
PCO2 BLDCOA: 61.8 MM[HG] (ref 30–60)
PCO2 BLDCOV: 48.5 MM HG (ref 27–43)
PH BLDCOA: 7.17 [PH] (ref 7.23–7.43)
PH BLDCOV: 7.27 [PH] (ref 7.19–7.49)
PLATELET # BLD AUTO: 242 THOUSANDS/UL (ref 149–390)
PMV BLD AUTO: 10.6 FL (ref 8.9–12.7)
PO2 BLDCOA: 15.1 MM HG (ref 5–25)
PO2 BLDCOV: 23.4 MM HG (ref 15–45)
RBC # BLD AUTO: 4.15 MILLION/UL (ref 3.81–5.12)
RH BLD: NEGATIVE
RPR SER QL: NORMAL
SAO2 % BLDCOV: 12.6 ML/DL
SPECIMEN EXPIRATION DATE: NORMAL
WBC # BLD AUTO: 11.85 THOUSAND/UL (ref 4.31–10.16)

## 2020-07-20 PROCEDURE — 86901 BLOOD TYPING SEROLOGIC RH(D): CPT | Performed by: OBSTETRICS & GYNECOLOGY

## 2020-07-20 PROCEDURE — 59400 OBSTETRICAL CARE: CPT | Performed by: OBSTETRICS & GYNECOLOGY

## 2020-07-20 PROCEDURE — 10907ZC DRAINAGE OF AMNIOTIC FLUID, THERAPEUTIC FROM PRODUCTS OF CONCEPTION, VIA NATURAL OR ARTIFICIAL OPENING: ICD-10-PCS | Performed by: OBSTETRICS & GYNECOLOGY

## 2020-07-20 PROCEDURE — 99024 POSTOP FOLLOW-UP VISIT: CPT | Performed by: OBSTETRICS & GYNECOLOGY

## 2020-07-20 PROCEDURE — 3E033VJ INTRODUCTION OF OTHER HORMONE INTO PERIPHERAL VEIN, PERCUTANEOUS APPROACH: ICD-10-PCS | Performed by: OBSTETRICS & GYNECOLOGY

## 2020-07-20 PROCEDURE — 85025 COMPLETE CBC W/AUTO DIFF WBC: CPT | Performed by: OBSTETRICS & GYNECOLOGY

## 2020-07-20 PROCEDURE — 82805 BLOOD GASES W/O2 SATURATION: CPT | Performed by: OBSTETRICS & GYNECOLOGY

## 2020-07-20 PROCEDURE — 0KQM0ZZ REPAIR PERINEUM MUSCLE, OPEN APPROACH: ICD-10-PCS | Performed by: OBSTETRICS & GYNECOLOGY

## 2020-07-20 PROCEDURE — 86850 RBC ANTIBODY SCREEN: CPT | Performed by: OBSTETRICS & GYNECOLOGY

## 2020-07-20 PROCEDURE — 4A1HXCZ MONITORING OF PRODUCTS OF CONCEPTION, CARDIAC RATE, EXTERNAL APPROACH: ICD-10-PCS | Performed by: OBSTETRICS & GYNECOLOGY

## 2020-07-20 PROCEDURE — 86900 BLOOD TYPING SEROLOGIC ABO: CPT | Performed by: OBSTETRICS & GYNECOLOGY

## 2020-07-20 PROCEDURE — 86592 SYPHILIS TEST NON-TREP QUAL: CPT | Performed by: OBSTETRICS & GYNECOLOGY

## 2020-07-20 RX ORDER — ROPIVACAINE HYDROCHLORIDE 2 MG/ML
INJECTION, SOLUTION EPIDURAL; INFILTRATION; PERINEURAL CONTINUOUS PRN
Status: DISCONTINUED | OUTPATIENT
Start: 2020-07-20 | End: 2020-07-20 | Stop reason: SURG

## 2020-07-20 RX ORDER — ROPIVACAINE HYDROCHLORIDE 2 MG/ML
INJECTION, SOLUTION EPIDURAL; INFILTRATION; PERINEURAL
Status: COMPLETED
Start: 2020-07-20 | End: 2020-07-20

## 2020-07-20 RX ORDER — IBUPROFEN 600 MG/1
600 TABLET ORAL EVERY 6 HOURS PRN
Status: DISCONTINUED | OUTPATIENT
Start: 2020-07-20 | End: 2020-07-21 | Stop reason: HOSPADM

## 2020-07-20 RX ORDER — SODIUM CHLORIDE, SODIUM LACTATE, POTASSIUM CHLORIDE, CALCIUM CHLORIDE 600; 310; 30; 20 MG/100ML; MG/100ML; MG/100ML; MG/100ML
125 INJECTION, SOLUTION INTRAVENOUS CONTINUOUS
Status: DISCONTINUED | OUTPATIENT
Start: 2020-07-20 | End: 2020-07-20

## 2020-07-20 RX ORDER — DIPHENHYDRAMINE HYDROCHLORIDE 50 MG/ML
25 INJECTION INTRAMUSCULAR; INTRAVENOUS EVERY 6 HOURS PRN
Status: DISCONTINUED | OUTPATIENT
Start: 2020-07-20 | End: 2020-07-20

## 2020-07-20 RX ORDER — CALCIUM CARBONATE 200(500)MG
1000 TABLET,CHEWABLE ORAL 3 TIMES DAILY PRN
Status: DISCONTINUED | OUTPATIENT
Start: 2020-07-20 | End: 2020-07-20 | Stop reason: SDUPTHER

## 2020-07-20 RX ORDER — SIMETHICONE 80 MG
80 TABLET,CHEWABLE ORAL 4 TIMES DAILY PRN
Status: DISCONTINUED | OUTPATIENT
Start: 2020-07-20 | End: 2020-07-21 | Stop reason: HOSPADM

## 2020-07-20 RX ORDER — MAGNESIUM HYDROXIDE/ALUMINUM HYDROXICE/SIMETHICONE 120; 1200; 1200 MG/30ML; MG/30ML; MG/30ML
15 SUSPENSION ORAL EVERY 6 HOURS PRN
Status: DISCONTINUED | OUTPATIENT
Start: 2020-07-20 | End: 2020-07-21 | Stop reason: HOSPADM

## 2020-07-20 RX ORDER — DOCUSATE SODIUM 100 MG/1
100 CAPSULE, LIQUID FILLED ORAL 2 TIMES DAILY
Status: DISCONTINUED | OUTPATIENT
Start: 2020-07-20 | End: 2020-07-21 | Stop reason: HOSPADM

## 2020-07-20 RX ORDER — SODIUM CHLORIDE, SODIUM LACTATE, POTASSIUM CHLORIDE, CALCIUM CHLORIDE 600; 310; 30; 20 MG/100ML; MG/100ML; MG/100ML; MG/100ML
125 INJECTION, SOLUTION INTRAVENOUS CONTINUOUS
Status: DISCONTINUED | OUTPATIENT
Start: 2020-07-20 | End: 2020-07-21 | Stop reason: HOSPADM

## 2020-07-20 RX ORDER — LIDOCAINE HYDROCHLORIDE AND EPINEPHRINE 15; 5 MG/ML; UG/ML
INJECTION, SOLUTION EPIDURAL AS NEEDED
Status: DISCONTINUED | OUTPATIENT
Start: 2020-07-20 | End: 2020-07-20 | Stop reason: SURG

## 2020-07-20 RX ORDER — OXYTOCIN/RINGER'S LACTATE 30/500 ML
1-30 PLASTIC BAG, INJECTION (ML) INTRAVENOUS
Status: DISCONTINUED | OUTPATIENT
Start: 2020-07-20 | End: 2020-07-20

## 2020-07-20 RX ORDER — ACETAMINOPHEN 325 MG/1
650 TABLET ORAL EVERY 4 HOURS PRN
Status: DISCONTINUED | OUTPATIENT
Start: 2020-07-20 | End: 2020-07-21 | Stop reason: HOSPADM

## 2020-07-20 RX ORDER — ROPIVACAINE HYDROCHLORIDE 5 MG/ML
INJECTION, SOLUTION EPIDURAL; INFILTRATION; PERINEURAL AS NEEDED
Status: DISCONTINUED | OUTPATIENT
Start: 2020-07-20 | End: 2020-07-20 | Stop reason: SURG

## 2020-07-20 RX ORDER — FAMOTIDINE 40 MG/5ML
20 POWDER, FOR SUSPENSION ORAL 2 TIMES DAILY
Status: DISCONTINUED | OUTPATIENT
Start: 2020-07-20 | End: 2020-07-21 | Stop reason: HOSPADM

## 2020-07-20 RX ORDER — SENNOSIDES 8.8 MG/5ML
8.8 LIQUID ORAL
Status: DISCONTINUED | OUTPATIENT
Start: 2020-07-20 | End: 2020-07-21 | Stop reason: HOSPADM

## 2020-07-20 RX ORDER — SENNOSIDES 8.6 MG
1 TABLET ORAL DAILY
Status: DISCONTINUED | OUTPATIENT
Start: 2020-07-20 | End: 2020-07-21 | Stop reason: HOSPADM

## 2020-07-20 RX ORDER — CALCIUM CARBONATE 200(500)MG
1000 TABLET,CHEWABLE ORAL DAILY PRN
Status: DISCONTINUED | OUTPATIENT
Start: 2020-07-20 | End: 2020-07-21 | Stop reason: HOSPADM

## 2020-07-20 RX ADMIN — WITCH HAZEL 1 PAD: 500 SOLUTION RECTAL; TOPICAL at 18:16

## 2020-07-20 RX ADMIN — BENZOCAINE AND LEVOMENTHOL: 200; 5 SPRAY TOPICAL at 16:01

## 2020-07-20 RX ADMIN — SODIUM CHLORIDE, SODIUM LACTATE, POTASSIUM CHLORIDE, AND CALCIUM CHLORIDE 125 ML/HR: .6; .31; .03; .02 INJECTION, SOLUTION INTRAVENOUS at 07:45

## 2020-07-20 RX ADMIN — Medication 2 MILLI-UNITS/MIN: at 07:45

## 2020-07-20 RX ADMIN — LIDOCAINE HYDROCHLORIDE AND EPINEPHRINE 5 ML: 15; 5 INJECTION, SOLUTION EPIDURAL at 12:03

## 2020-07-20 RX ADMIN — IBUPROFEN 600 MG: 600 TABLET ORAL at 16:01

## 2020-07-20 RX ADMIN — DOCUSATE SODIUM 100 MG: 100 CAPSULE, LIQUID FILLED ORAL at 18:16

## 2020-07-20 RX ADMIN — IBUPROFEN 600 MG: 600 TABLET ORAL at 22:07

## 2020-07-20 RX ADMIN — ROPIVACAINE HYDROCHLORIDE 5 ML: 5 INJECTION, SOLUTION EPIDURAL; INFILTRATION; PERINEURAL at 12:03

## 2020-07-20 RX ADMIN — ROPIVACAINE HYDROCHLORIDE 10 ML/HR: 2 INJECTION, SOLUTION EPIDURAL; INFILTRATION at 12:12

## 2020-07-20 RX ADMIN — FAMOTIDINE 20 MG: 40 POWDER, FOR SUSPENSION ORAL at 09:14

## 2020-07-20 RX ADMIN — ROPIVACAINE HYDROCHLORIDE 4 ML: 5 INJECTION, SOLUTION EPIDURAL; INFILTRATION; PERINEURAL at 12:12

## 2020-07-20 NOTE — ANESTHESIA PREPROCEDURE EVALUATION
Review of Systems/Medical History  Patient summary reviewed  Chart reviewed  No history of anesthetic complications     Cardiovascular  Hyperlipidemia,    Pulmonary  Negative pulmonary ROS        GI/Hepatic  Negative GI/hepatic ROS          Negative  ROS        Endo/Other  Negative endo/other ROS      GYN  Currently pregnant , Prior pregnancy/OB history : 4 Parity: 2,          Hematology  Negative hematology ROS      Musculoskeletal  Negative musculoskeletal ROS        Neurology  Negative neurology ROS      Psychology   Negative psychology ROS              Physical Exam    Airway    Mallampati score: I  TM Distance: >3 FB  Neck ROM: full     Dental       Cardiovascular  Rhythm: regular, Rate: normal,     Pulmonary  Breath sounds clear to auscultation,     Other Findings        Anesthesia Plan  ASA Score- 2     Anesthesia Type- epidural with ASA Monitors  Additional Monitors:   Airway Plan:         Plan Factors-    Induction-     Postoperative Plan-     Informed Consent- Anesthetic plan and risks discussed with patient and spouse

## 2020-07-20 NOTE — LACTATION NOTE
This note was copied from a baby's chart  CONSULT - LACTATION  Baby Girl Rivka Morejon 0 days female MRN: [id number]    [facility] Room / Bed: L&D 310(N)/L&D 310(N) Encounter: [id number]    Maternal Information     MOTHER:  Cori Morejon  Maternal Age: 26 y o    OB History: #: 1, Date: 14, Sex: Male, Weight: 3459 g (7 lb 10 oz), GA: 39w6d, Delivery: Vaginal, Spontaneous, Apgar1: None, Apgar5: None, Living: Living, Birth Comments: None    #: 2, Date: 17, Sex: None, Weight: None, GA: None, Delivery: Spontaneous , Apgar1: None, Apgar5: None, Living: None, Birth Comments: None    #: 3, Date: 10/04/18, Sex: Female, Weight: 3245 g (7 lb 2 5 oz), GA: 39w1d, Delivery: Vaginal, Spontaneous, Apgar1: 9, Apgar5: 10, Living: Living, Birth Comments: None    #: 4, Date: None, Sex: None, Weight: None, GA: None, Delivery: None, Apgar1: None, Apgar5: None, Living: None, Birth Comments: None   Previouse breast reduction surgery?  No    Lactation history:   Has patient previously breast fed: Yes   How long had patient previously breast fed: 10 months   Previous breast feeding complications: Exclusive pump and bottle fed, Other (Comment)(Latch on difficulties; mostly expressed and fed)     Past Surgical History:   Procedure Laterality Date    DENTAL SURGERY      WISDOM TOOTH EXTRACTION         Birth information:  YOB: 2022   Time of birth: 1:80 PM   Sex: female   Delivery type: Vaginal, Spontaneous   Birth Weight: 3040 g (6 lb 11 2 oz)   Percent of Weight Change: 0%     Gestational Age: 39w0d   [unfilled]    Assessment     Breast and nipple assessment: denies need for assistance    Husser Assessment: sleeping with dad    Feeding assessment: feeding well as per mom  LATCH:  Latch: Grasps breast, tongue down, lips flanged, rhythmic sucking   Audible Swallowing: A few with stimulation   Type of Nipple: Everted (After stimulation)   Comfort (Breast/Nipple): Soft/non-tender   Hold (Positioning): No assist from staff, mother able to position/hold infant   LATCH Score: 9          Feeding recommendations:  breast feed on demand     Met with mother and father  Provided mother with Ready, Set, Baby booklet  Discussed Skin to Skin contact an benefits to mom and baby  Talked about the delay of the first bath until baby has adjusted  Spoke about the benefits of rooming in  Feeding on cue and what that means for recognizing infant's hunger  Avoidance of pacifiers for the first month discussed  Talked about exclusive breastfeeding for the first 6 months  Positioning and latch reviewed as well as showing images of other feeding positions  Discussed the properties of a good latch in any position  Reviewed hand/manual expression  Discussed s/s that baby is getting enough milk and some s/s that breastfeeding dyad may need further help  Gave information on common concerns, what to expect the first few weeks after delivery, preparing for other caregivers, and how partners can help  Resources for support also provided  Encouraged parents to call for assistance, questions, and concerns about breastfeeding  Extension provided      Tesha Cheung RN 7/20/2020 6:26 PM

## 2020-07-20 NOTE — ANESTHESIA POSTPROCEDURE EVALUATION
Post-Op Assessment Note    CV Status:  Stable    Pain management: adequate     Mental Status:  Alert and awake   Hydration Status:  Euvolemic   PONV Controlled:  Controlled   Airway Patency:  Patent   Post Op Vitals Reviewed: Yes      Staff: Anesthesiologist     Post-op block assessment: no complications and catheter intact    Blood pressure 135/80, pulse (!) 52, temperature 98 °F (36 7 °C), temperature source Oral, resp  rate 18, height 5' 6" (1 676 m), weight 102 kg (225 lb), last menstrual period 10/15/2019, currently breastfeeding          BP      Temp      Pulse    Resp      SpO2

## 2020-07-20 NOTE — PROGRESS NOTES
H&P Exam - Obstetrics   Ines Hall 26 y o  female MRN: [id number]  Unit/Bed#: L&D 325-01 Encounter: [id number]      History of Present Illness     Chief Complaint: Induction of labor    HPI:  Ines Hall is a 26 y o  [id number] female with an BONITA of 2020, by Ultrasound at 39w0d weeks gestation who is being admitted for Induction of labor  Her current obstetrical history is significant for none  Contractions: irregular  Leakage of fluid: None  Bleeding: None  Fetal movement: present      PREGNANCY COMPLICATIONS: Rh negative    OB History    Para Term  AB Living   4 2 2 0 1 2   SAB TAB Ectopic Multiple Live Births   1 0 0 0 2      # Outcome Date GA Lbr Misha/2nd Weight Sex Delivery Anes PTL Lv   4 Current            3 Term 10/04/18 39w1d 06:25 / 00:07 3245 g (7 lb 2 5 oz) F Vag-Spont EPI, Local N ZEN   2 SAB 17     SAB      1 Term 14 39w6d  3459 g (7 lb 10 oz) M Vag-Spont EPI N ZEN       Baby complications/comments: none    Review of Systems    Historical Information   Past Medical History:   Diagnosis Date    Pregnancy with uncertain fetal viability     Last assessed - 17    Spontaneous      Varicella     childhood     Past Surgical History:   Procedure Laterality Date    DENTAL SURGERY      WISDOM TOOTH EXTRACTION       Social History   Social History     Substance and Sexual Activity   Alcohol Use Not Currently    Comment: social pre-pregnancy     Social History     Substance and Sexual Activity   Drug Use Never     Social History     Tobacco Use   Smoking Status Never Smoker   Smokeless Tobacco Never Used     Family History: non-contributory    Meds/Allergies      Medications Prior to Admission   Medication    famotidine (PEPCID) 10 mg tablet    LORATADINE PO    Prenatal Vit-Fe Fumarate-FA (PRENATAL VITAMIN PLUS LOW IRON) 27-1 MG TABS        Allergies   Allergen Reactions    Other Allergic Rhinitis     enviornmental       OBJECTIVE:    Vitals: Last menstrual period 10/15/2019, currently breastfeeding  There is no height or weight on file to calculate BMI  Physical Exam    Cervix: Dilation: 3cm, Effacement:  70%, Station:  -2, Consistency: medium and Position:  mid       Fetal heart rate: Baseline: 140 bpm, Variability: Moderate 6 - 25 bpm, Accelerations: Reactive, Decelerations: Absent and Category 1  Baseline Rate: 140 bpm  FHR Category: Category I    [place]: irregular, every 5-7 minutes  Contraction Frequency (minutes): 5-6  Contraction Duration (seconds): 30  Contraction Quality: Mild    Prenatal Labs: I have personally reviewed pertinent reports  Invasive Devices     None                   Assessment/Plan     ASSESSMENT:   IUP at 39w0d weeks gestation for elective  induction  PLAN:   1) Admit   2) CBC, RPR, Blood Type   3) Analgesia and/or epidural at patient request   4) Anticipate    5) Tums ordered for heartburn      This patient will be an INPATIENT  and I certify the anticipated length of stay is >2 Midnights      Selina Tellez MD  2020  7:37 AM

## 2020-07-20 NOTE — L&D DELIVERY NOTE
Delivery Summary - OB/GYN   Knanan Talamantes 26 y o  female MRN: [id number]  Unit/Bed#: L&D 310-01 Encounter: [id number]    Pre-delivery Diagnosis:   1  39w0d pregnancy  2  Elective IOL  3  Rh negative maternal status     Post-delivery Diagnosis: same    Attending: Carol Mckeon MD      Assistant(s): none     Procedure: spontaneous vaginal delivery      Anesthesia: epidural    Estimated Blood Loss:see QBL    Specimens:   1  Arterial and venous cord gases  2  Cord blood  3  Segment of umbilical cord  4  Placenta to storage     Complications:  None apparent    Findings:  1  Viable female  delivered at 1407 weighing 6lbs 11oz; Apgar scores of 8 at one minute and 9 at five minutes  2  Spontaneous delivery of placenta with centrally inserted 3-vessel cord  4  2nd degree perineal laceration, repaired with 3-0Vicryl       Disposition: Patient tolerated the procedure well and was recovering in labor and delivery room with family and  before being transferred to the post-partum floor  Procedure Details     Description of procedure    After pushing for 2 minutes, at 1407 patient delivered a viable male or female , weighing 6lbs 11 oz g, Apgars of 8 (1 min) and 9 (5 min)  The fetal vertex delivered spontaneously  There was no nuchal cord  The anterior shoulder delivered atraumatically with maternal expulsive forces and the assistance of downward traction  The posterior shoulder delivered with maternal expulsive forces and the assistance of upward traction  The remainder of the fetus delivered spontaneously  Upon delivery, the infant was placed on the mothers abdomen and the cord was clamped and cut  The infant was noted to cry spontaneously and was moving all extremities appropriately  There was no evidence for injury  Awaiting nurse resuscitators evaluated the  at bedside  Arterial and venous cord blood gases and cord blood was collected for analysis   These were promptly sent to the lab  In the immediate post-partum, 30 units of IV pitocin was administered and the uterus was noted to contract down well with massage and pitocin  The placenta delivered spontaneously at 1413 and was noted to have a centrally inserted 3 vessel cord  The vagina, cervix, and perineum were inspected and there was noted to be second degree laceration   Laceration Repair  Patient was comfortable with epidural at that time  A second degree perineal laceration  was identified and required repair  Laceration was repaired with 3-0 Vicryl  to reapproximate the laceration  Good hemostasis was confirmed at the conclusion of this procedure  At the conclusion of the delivery, all needle, sponge, and instrument counts were noted to be correct  Patient tolerated the procedure well and was allowed to recover in labor and delivery room with family and  before being transferred to the post-partum floor  I was present and participated in all key portions of the case

## 2020-07-20 NOTE — H&P
H&P Exam - Obstetrics   Karrie Ayoub 26 y o  female MRN: [id number]  Unit/Bed#: L&D 325-01 Encounter: [id number]      History of Present Illness     Chief Complaint: Labor induction    HPI:  Karrie Ayoub is a 26 y o  [id number] female with an BONITA of 2020, by Ultrasound at 39w0d weeks gestation who is being admitted for induction of labor  Denies complaints today  [facility]  She would eventually like an epidural for labor pain      PREGNANCY COMPLICATIONS:   Rh negative  Prior placenta previa, now resolved    OB History    Para Term  AB Living   4 2 2 0 1 2   SAB TAB Ectopic Multiple Live Births   1 0 0 0 2      # Outcome Date GA Lbr Misha/2nd Weight Sex Delivery Anes PTL Lv   4 Current            3 Term 10/04/18 39w1d 06:25 / 00:07 3245 g (7 lb 2 5 oz) F Vag-Spont EPI, Local N ZEN   2 SAB 17     SAB      1 Term 14 39w6d  3459 g (7 lb 10 oz) M Vag-Spont EPI N ZEN       Baby complications/comments:   Vertex  Fetus measuring 41st %ile on 2020  Posterior placenta    Review of Systems    Historical Information   Past Medical History:   Diagnosis Date    Pregnancy with uncertain fetal viability     Last assessed - 17    Spontaneous      Varicella     childhood     Past Surgical History:   Procedure Laterality Date    DENTAL SURGERY      WISDOM TOOTH EXTRACTION       Social History   Social History     Substance and Sexual Activity   Alcohol Use Not Currently    Comment: social pre-pregnancy     Social History     Substance and Sexual Activity   Drug Use Never     Social History     Tobacco Use   Smoking Status Never Smoker   Smokeless Tobacco Never Used     Family History:   Family History   Problem Relation Age of Onset    Hypertension Mother     Arthritis Mother     Hypertension Father     Arthritis Father     Diabetes Father     Hyperlipidemia Father     Atrial fibrillation Father     Diabetes Maternal Grandmother     Diabetes Maternal Grandfather     Heart attack Maternal Grandfather     Diabetes Paternal Grandmother     Polycystic ovary syndrome Sister     Thyroid disease Brother     Heart attack Paternal Grandfather     Alcohol abuse Maternal Uncle     Drug abuse Maternal Uncle     Drug abuse Paternal Uncle     Alcohol abuse Paternal Uncle     Mental illness Neg Hx        Meds/Allergies    {  Medications Prior to Admission   Medication    famotidine (PEPCID) 10 mg tablet    LORATADINE PO    Prenatal Vit-Fe Fumarate-FA (PRENATAL VITAMIN PLUS LOW IRON) 27-1 MG TABS        Allergies   Allergen Reactions    Other Allergic Rhinitis     enviornmental       OBJECTIVE:    Vitals:   /75   Pulse 81   Temp 98 °F (36 7 °C) (Oral)   Resp 18   Ht 5' 6" (1 676 m)   Wt 102 kg (225 lb)   LMP 10/15/2019   BMI 36 32 kg/m²   Body mass index is 36 32 kg/m²  Physical Exam   Constitutional: She is oriented to person, place, and time  She appears well-developed and well-nourished  No distress  HENT:   Head: Normocephalic and atraumatic  Neck: Normal range of motion  Neck supple  Cardiovascular: Normal rate, regular rhythm and normal heart sounds  Exam reveals no gallop and no friction rub  No murmur heard  Pulmonary/Chest: Effort normal and breath sounds normal  No respiratory distress  She has no wheezes  She has no rales  Abdominal: Soft  There is no tenderness  There is no rebound and no guarding  Gravid uterus   Genitourinary: Vagina normal    Neurological: She is alert and oriented to person, place, and time  Skin: Skin is warm and dry  She is not diaphoretic  Psychiatric: She has a normal mood and affect  Her behavior is normal    Vitals reviewed    Examined by Dr Yadira Culp    SVE: Dilation: 3  Effacement (%): 70  Station: -2  Method: Manual  OB Examiner: Heath    FHT:  Baseline Rate: 140 bpm  Variability: Moderate 6-25 bpm  Accelerations: 15 x 15 or greater, At variable times  Decelerations: Variable  TOCO:   Contraction Frequency (minutes): 2-2 5  Contraction Duration (seconds): 50-70  Contraction Quality: Mild      Prenatal Labs:   Blood type: A negative  Antibody Screen: Negative  Rubella: Immune  HIV: NR  RPR: NR  Hep B: NR  Diabetes Screen: 120  GBS: Negative    Assessment/Plan     ASSESSMENT:  27 yo  at 39w0d weeks gestation for elective induction of labor  PLAN:   1) Admit to Labor and Delivery   2) Admit labs: CBC, RPR, Blood Type   3) Analgesia and/or epidural at patient request   4) Pitocin per protocol for induction of labor   5) FEN: Clears,  cc/hr      This patient will be an INPATIENT  and I certify the anticipated length of stay is >2 Midnights        Susie Child MD  2020  9:33 AM

## 2020-07-20 NOTE — ANESTHESIA PROCEDURE NOTES
Epidural Block    Patient location during procedure: OB  Start time: 7/20/2020 12:02 PM  Reason for block: primary anesthetic  Staffing  Anesthesiologist: Kalie Murdock DO  Performed: anesthesiologist   Preanesthetic Checklist  Completed: patient identified, site marked, surgical consent, pre-op evaluation, timeout performed, IV checked, risks and benefits discussed and monitors and equipment checked  Epidural  Patient position: sitting  Prep: Betadine  Patient monitoring: frequent blood pressure checks  Approach: midline  Location: lumbar (1-5)  Injection technique: ETTA saline  Needle  Needle type: Tuohy   Needle gauge: 18 G  Catheter type: side hole  Catheter size: 20 G  Catheter at skin depth: 13 cm  Test dose: negativeno paresthesia on injection, negative aspiration for heme and negative aspiration for CSF  patient tolerated the procedure well with no immediate complications

## 2020-07-20 NOTE — PLAN OF CARE
Problem: Knowledge Deficit  Goal: Verbalizes understanding of labor plan  Description  Assess patient/family/caregiver's baseline knowledge level and ability to understand information  Provide education via patient/family/caregiver's preferred learning method at appropriate level of understanding  1  Provide teaching at level of understanding  2  Provide teaching via preferred learning method(s)  7/20/2020 1458 by Gustavo Sanders RN  Outcome: Completed  7/20/2020 0755 by Gustavo Sanders RN  Outcome: Progressing     Problem: Labor & Delivery  Goal: Manages discomfort  Description  Assess and monitor for signs and symptoms of discomfort  Assess patient's pain level regularly and per hospital policy  Administer medications as ordered  Support use of nonpharmacological methods to help control pain such as distraction, imagery, relaxation, and application of heat and cold  Collaborate with interdisciplinary team and patient to determine appropriate pain management plan  1  Include patient in decisions related to comfort  2  Offer non-pharmacological pain management interventions  3  Report ineffective pain management to physician   7/20/2020 1458 by Gustavo Sanders RN  Outcome: Completed  7/20/2020 0755 by Gustavo Sanders RN  Outcome: Progressing  Goal: Patient vital signs are stable  Description  1  Assess vital signs - vaginal delivery    7/20/2020 1458 by Gustavo Sanders RN  Outcome: Completed  7/20/2020 0755 by Gustavo Sanders RN  Outcome: Progressing

## 2020-07-20 NOTE — OB LABOR/OXYTOCIN SAFETY PROGRESS
Oxytocin Safety Progress Check Note - Marty Valenzuela 26 y o  female MRN: [id number]    Unit/Bed#: L&D 325-01 Encounter: [id number]    Dose (stevo-units/min) Oxytocin: 18 stevo-units/min  Contraction Frequency (minutes): 1-2  Contraction Quality: Moderate  Tachysystole: No   Dilation: 8-9        Effacement (%): 100  Station: 0  Baseline Rate: 135 bpm     FHR Category: Category I             Notes/comments:   Comfortable with epidural   Will continue current management     Andrew Ross MD 7/20/2020 1:56 PM

## 2020-07-20 NOTE — OB LABOR/OXYTOCIN SAFETY PROGRESS
Oxytocin Safety Progress Check Note - Karrie Ayoub 26 y o  female MRN: [id number]    Unit/Bed#: L&D 325-01 Encounter: [id number]    Dose (stevo-units/min) Oxytocin: 14 stevo-units/min  Contraction Frequency (minutes): 2-2 5  Contraction Quality: Moderate  Tachysystole: No   Dilation: 4        Effacement (%): 70  Station: -2  Baseline Rate: 130 bpm     FHR Category: Category I             Notes/comments:   Patient desires epidural prior to AROM   Feeling her contractions consistently     Linnette Patel MD 7/20/2020 11:57 AM

## 2020-07-20 NOTE — PLAN OF CARE
Problem: PAIN - ADULT  Goal: Verbalizes/displays adequate comfort level or baseline comfort level  Description  Interventions:  - Encourage patient to monitor pain and request assistance  - Assess pain using appropriate pain scale  - Administer analgesics based on type and severity of pain and evaluate response  - Implement non-pharmacological measures as appropriate and evaluate response  - Consider cultural and social influences on pain and pain management  - Notify physician/advanced practitioner if interventions unsuccessful or patient reports new pain  Outcome: Progressing     Problem: INFECTION - ADULT  Goal: Absence or prevention of progression during hospitalization  Description  INTERVENTIONS:  - Assess and monitor for signs and symptoms of infection  - Monitor lab/diagnostic results  - Monitor all insertion sites, i e  indwelling lines, tubes, and drains  - Monitor endotracheal if appropriate and nasal secretions for changes in amount and color  - [place] appropriate cooling/warming therapies per order  - Administer medications as ordered  - Instruct and encourage patient and family to use good hand hygiene technique  - Identify and instruct in appropriate isolation precautions for identified infection/condition  Outcome: Progressing  Goal: Absence of fever/infection during neutropenic period  Description  INTERVENTIONS:  - Monitor WBC    Outcome: Progressing     Problem: SAFETY ADULT  Goal: Patient will remain free of falls  Description  INTERVENTIONS:  - Assess patient frequently for physical needs  -  Identify cognitive and physical deficits and behaviors that affect risk of falls    -  [place] fall precautions as indicated by assessment   - Educate patient/family on patient safety including physical limitations  - Instruct patient to call for assistance with activity based on assessment  - Modify environment to reduce risk of injury  - Consider OT/PT consult to assist with strengthening/mobility  Outcome: Progressing  Goal: Maintain or return to baseline ADL function  Description  INTERVENTIONS:  -  Assess patient's ability to carry out ADLs; assess patient's baseline for ADL function and identify physical deficits which impact ability to perform ADLs (bathing, care of mouth/teeth, toileting, grooming, dressing, etc )  - Assess/evaluate cause of self-care deficits   - Assess range of motion  - Assess patient's mobility; develop plan if impaired  - Assess patient's need for assistive devices and provide as appropriate  - Encourage maximum independence but intervene and supervise when necessary  - Involve family in performance of ADLs  - Assess for home care needs following discharge   - Consider OT consult to assist with ADL evaluation and planning for discharge  - Provide patient education as appropriate  Outcome: Progressing  Goal: Maintain or return mobility status to optimal level  Description  INTERVENTIONS:  - Assess patient's baseline mobility status (ambulation, transfers, stairs, etc )    - Identify cognitive and physical deficits and behaviors that affect mobility  - Identify mobility aids required to assist with transfers and/or ambulation (gait belt, sit-to-stand, lift, walker, cane, etc )  - [place] fall precautions as indicated by assessment  - Record patient progress and toleration of activity level on Mobility SBAR; progress patient to next Phase/Stage  - Instruct patient to call for assistance with activity based on assessment  - Consider rehabilitation consult to assist with strengthening/weightbearing, etc   Outcome: Progressing     Problem: Knowledge Deficit  Goal: Patient/family/caregiver demonstrates understanding of disease process, treatment plan, medications, and discharge instructions  Description  Complete learning assessment and assess knowledge base    Interventions:  - Provide teaching at level of understanding  - Provide teaching via preferred learning methods  Outcome: Progressing     Problem: DISCHARGE PLANNING  Goal: Discharge to home or other facility with appropriate resources  Description  INTERVENTIONS:  - Identify barriers to discharge w/patient and caregiver  - Arrange for needed discharge resources and transportation as appropriate  - Identify discharge learning needs (meds, wound care, etc )  - Arrange for interpretive services to assist at discharge as needed  - Refer to Case Management Department for coordinating discharge planning if the patient needs post-hospital services based on physician/advanced practitioner order or complex needs related to functional status, cognitive ability, or social support system  Outcome: Progressing

## 2020-07-20 NOTE — OB LABOR/OXYTOCIN SAFETY PROGRESS
Oxytocin Safety Progress Check Note -   Ese Junior 26 y o  female MRN: [id number]    Unit/Bed#: L&D 325-01 Encounter: [id number]       Contraction Frequency (minutes): 5-6  Contraction Quality: Mild                     Baseline Rate: 140 bpm     FHR Category: Category I             Notes/comments:       Kathy Noonan MD 7/20/2020 7:36 AM

## 2020-07-20 NOTE — OB LABOR/OXYTOCIN SAFETY PROGRESS
Oxytocin Safety Progress Check Note - Denies Patiño 26 y o  female MRN: [id number]    Unit/Bed#: L&D 325-01 Encounter: [id number]    Dose (stevo-units/min) Oxytocin: 18 stevo-units/min  Contraction Frequency (minutes): 2-2 5  Contraction Quality: Moderate  Tachysystole: No   Dilation: 4        Effacement (%): 80  Station: -1  Baseline Rate: 125 bpm     FHR Category: Category I             Notes/comments:   Patient comfortable with epidural   AROM for clear fluid   Will continue current management      Suraj Arreaga MD 7/20/2020 12:39 PM

## 2020-07-21 VITALS
OXYGEN SATURATION: 98 % | HEART RATE: 57 BPM | WEIGHT: 225 LBS | SYSTOLIC BLOOD PRESSURE: 118 MMHG | BODY MASS INDEX: 36.16 KG/M2 | RESPIRATION RATE: 16 BRPM | DIASTOLIC BLOOD PRESSURE: 76 MMHG | HEIGHT: 66 IN | TEMPERATURE: 98.5 F

## 2020-07-21 PROBLEM — O13.9 GESTATIONAL HYPERTENSION: Status: ACTIVE | Noted: 2020-07-21

## 2020-07-21 LAB
ABO GROUP BLD: NORMAL
ALBUMIN SERPL BCP-MCNC: 2.2 G/DL (ref 3.5–5)
ALP SERPL-CCNC: 92 U/L (ref 46–116)
ALT SERPL W P-5'-P-CCNC: 15 U/L (ref 12–78)
ANION GAP SERPL CALCULATED.3IONS-SCNC: 8 MMOL/L (ref 4–13)
AST SERPL W P-5'-P-CCNC: 18 U/L (ref 5–45)
BILIRUB SERPL-MCNC: 0.3 MG/DL (ref 0.2–1)
BLD GP AB SCN SERPL QL: NEGATIVE
BUN SERPL-MCNC: 8 MG/DL (ref 5–25)
CALCIUM SERPL-MCNC: 8.4 MG/DL (ref 8.3–10.1)
CHLORIDE SERPL-SCNC: 104 MMOL/L (ref 100–108)
CO2 SERPL-SCNC: 27 MMOL/L (ref 21–32)
CREAT SERPL-MCNC: 0.59 MG/DL (ref 0.6–1.3)
ERYTHROCYTE [DISTWIDTH] IN BLOOD BY AUTOMATED COUNT: 13.2 % (ref 11.6–15.1)
FETAL CELL SCN BLD QL ROSETTE: NEGATIVE
GFR SERPL CREATININE-BSD FRML MDRD: 122 ML/MIN/1.73SQ M
GLUCOSE SERPL-MCNC: 72 MG/DL (ref 65–140)
HCT VFR BLD AUTO: 31.4 % (ref 34.8–46.1)
HGB BLD-MCNC: 10.1 G/DL (ref 11.5–15.4)
MCH RBC QN AUTO: 27 PG (ref 26.8–34.3)
MCHC RBC AUTO-ENTMCNC: 32.2 G/DL (ref 31.4–37.4)
MCV RBC AUTO: 84 FL (ref 82–98)
PLATELET # BLD AUTO: 190 THOUSANDS/UL (ref 149–390)
PMV BLD AUTO: 10.5 FL (ref 8.9–12.7)
POTASSIUM SERPL-SCNC: 4.4 MMOL/L (ref 3.5–5.3)
PROT SERPL-MCNC: 6 G/DL (ref 6.4–8.2)
RBC # BLD AUTO: 3.74 MILLION/UL (ref 3.81–5.12)
RH BLD: NEGATIVE
SODIUM SERPL-SCNC: 139 MMOL/L (ref 136–145)
WBC # BLD AUTO: 11.25 THOUSAND/UL (ref 4.31–10.16)

## 2020-07-21 PROCEDURE — 86900 BLOOD TYPING SEROLOGIC ABO: CPT | Performed by: OBSTETRICS & GYNECOLOGY

## 2020-07-21 PROCEDURE — 86901 BLOOD TYPING SEROLOGIC RH(D): CPT | Performed by: OBSTETRICS & GYNECOLOGY

## 2020-07-21 PROCEDURE — 99024 POSTOP FOLLOW-UP VISIT: CPT | Performed by: OBSTETRICS & GYNECOLOGY

## 2020-07-21 PROCEDURE — 85027 COMPLETE CBC AUTOMATED: CPT | Performed by: STUDENT IN AN ORGANIZED HEALTH CARE EDUCATION/TRAINING PROGRAM

## 2020-07-21 PROCEDURE — 80053 COMPREHEN METABOLIC PANEL: CPT | Performed by: STUDENT IN AN ORGANIZED HEALTH CARE EDUCATION/TRAINING PROGRAM

## 2020-07-21 PROCEDURE — 85461 HEMOGLOBIN FETAL: CPT | Performed by: OBSTETRICS & GYNECOLOGY

## 2020-07-21 PROCEDURE — 86850 RBC ANTIBODY SCREEN: CPT | Performed by: OBSTETRICS & GYNECOLOGY

## 2020-07-21 RX ORDER — IBUPROFEN 600 MG/1
600 TABLET ORAL EVERY 6 HOURS PRN
Qty: 30 TABLET | Refills: 0 | Status: SHIPPED | OUTPATIENT
Start: 2020-07-21 | End: 2020-12-16 | Stop reason: ALTCHOICE

## 2020-07-21 RX ORDER — ACETAMINOPHEN 325 MG/1
650 TABLET ORAL EVERY 4 HOURS PRN
Qty: 30 TABLET | Refills: 0
Start: 2020-07-21 | End: 2020-12-16

## 2020-07-21 RX ORDER — CYCLOBENZAPRINE HCL 10 MG
10 TABLET ORAL 3 TIMES DAILY PRN
Qty: 10 TABLET | Refills: 0 | Status: SHIPPED | OUTPATIENT
Start: 2020-07-21 | End: 2020-12-16 | Stop reason: ALTCHOICE

## 2020-07-21 RX ADMIN — IBUPROFEN 600 MG: 600 TABLET ORAL at 12:05

## 2020-07-21 RX ADMIN — DOCUSATE SODIUM 100 MG: 100 CAPSULE, LIQUID FILLED ORAL at 08:16

## 2020-07-21 RX ADMIN — IBUPROFEN 600 MG: 600 TABLET ORAL at 06:20

## 2020-07-21 RX ADMIN — FAMOTIDINE 20 MG: 40 POWDER, FOR SUSPENSION ORAL at 08:16

## 2020-07-21 RX ADMIN — HUMAN RHO(D) IMMUNE GLOBULIN 300 MCG: 300 INJECTION, SOLUTION INTRAMUSCULAR at 11:58

## 2020-07-21 NOTE — PLAN OF CARE
Problem: PAIN - ADULT  Goal: Verbalizes/displays adequate comfort level or baseline comfort level  Description  Interventions:  - Encourage patient to monitor pain and request assistance  - Assess pain using appropriate pain scale  - Administer analgesics based on type and severity of pain and evaluate response  - Implement non-pharmacological measures as appropriate and evaluate response  - Consider cultural and social influences on pain and pain management  - Notify physician/advanced practitioner if interventions unsuccessful or patient reports new pain  Outcome: Progressing     Problem: INFECTION - ADULT  Goal: Absence or prevention of progression during hospitalization  Description  INTERVENTIONS:  - Assess and monitor for signs and symptoms of infection  - Monitor lab/diagnostic results  - Monitor all insertion sites, i e  indwelling lines, tubes, and drains  - Monitor endotracheal if appropriate and nasal secretions for changes in amount and color  - [place] appropriate cooling/warming therapies per order  - Administer medications as ordered  - Instruct and encourage patient and family to use good hand hygiene technique  - Identify and instruct in appropriate isolation precautions for identified infection/condition  Outcome: Progressing  Goal: Absence of fever/infection during neutropenic period  Description  INTERVENTIONS:  - Monitor WBC    Outcome: Progressing     Problem: SAFETY ADULT  Goal: Patient will remain free of falls  Description  INTERVENTIONS:  - Assess patient frequently for physical needs  -  Identify cognitive and physical deficits and behaviors that affect risk of falls    -  [place] fall precautions as indicated by assessment   - Educate patient/family on patient safety including physical limitations  - Instruct patient to call for assistance with activity based on assessment  - Modify environment to reduce risk of injury  - Consider OT/PT consult to assist with strengthening/mobility  Outcome: Progressing  Goal: Maintain or return to baseline ADL function  Description  INTERVENTIONS:  -  Assess patient's ability to carry out ADLs; assess patient's baseline for ADL function and identify physical deficits which impact ability to perform ADLs (bathing, care of mouth/teeth, toileting, grooming, dressing, etc )  - Assess/evaluate cause of self-care deficits   - Assess range of motion  - Assess patient's mobility; develop plan if impaired  - Assess patient's need for assistive devices and provide as appropriate  - Encourage maximum independence but intervene and supervise when necessary  - Involve family in performance of ADLs  - Assess for home care needs following discharge   - Consider OT consult to assist with ADL evaluation and planning for discharge  - Provide patient education as appropriate  Outcome: Progressing  Goal: Maintain or return mobility status to optimal level  Description  INTERVENTIONS:  - Assess patient's baseline mobility status (ambulation, transfers, stairs, etc )    - Identify cognitive and physical deficits and behaviors that affect mobility  - Identify mobility aids required to assist with transfers and/or ambulation (gait belt, sit-to-stand, lift, walker, cane, etc )  - [place] fall precautions as indicated by assessment  - Record patient progress and toleration of activity level on Mobility SBAR; progress patient to next Phase/Stage  - Instruct patient to call for assistance with activity based on assessment  - Consider rehabilitation consult to assist with strengthening/weightbearing, etc   Outcome: Progressing     Problem: Knowledge Deficit  Goal: Patient/family/caregiver demonstrates understanding of disease process, treatment plan, medications, and discharge instructions  Description  Complete learning assessment and assess knowledge base    Interventions:  - Provide teaching at level of understanding  - Provide teaching via preferred learning methods  Outcome: Progressing     Problem: DISCHARGE PLANNING  Goal: Discharge to home or other facility with appropriate resources  Description  INTERVENTIONS:  - Identify barriers to discharge w/patient and caregiver  - Arrange for needed discharge resources and transportation as appropriate  - Identify discharge learning needs (meds, wound care, etc )  - Arrange for interpretive services to assist at discharge as needed  - Refer to Case Management Department for coordinating discharge planning if the patient needs post-hospital services based on physician/advanced practitioner order or complex needs related to functional status, cognitive ability, or social support system  Outcome: Progressing

## 2020-07-21 NOTE — DISCHARGE SUMMARY
Discharge Summary - Jasmyn Gonzalez 26 y o  female MRN: [id number]    Unit/Bed#: L&D 310-01 Encounter: [id number]    Admission Date: 2020     Discharge Date: 2020    Delivering Attending: Malorie Rodriguez MD  Discharge Attending: Malorie Rodriguez MD    Admitting Diagnosis:   1  Pregnancy at 39w0d  2  Rh negative status      Discharge Diagnosis:   Same, delivered  Gestational hypertension    Procedures: Spontaneous Vaginal Delivery    Complications: none apparent    Hospital Course:     Jasmyn Gonzalez is a 26 y o  [id number] at 39w0d wks who was initially admitted for elective induction of labor  Patient was started on Pitocin for induction of labor and received an epidural for labor analgesia  She underwent amniotomy and progressed well to complete  She delivered a viable female  Mccartney Punch!) on 2020 at [phone]  Weight 6lbs 11 2oz via spontaneous vaginal delivery  Apgars were 8 (1 min) and 9 (5 min)   was transferred to  nursery  Patient tolerated the procedure well and was transferred to recovery in stable condition  Shad Hummel was noted to have some elevated blood pressures in her postpartum course diagnosing her with gestational hypertension  CBC and CMP completed were noted to be within normal limits    Condition at discharge: good     On day of discharge pain was well controlled, patient was tolerating PO with appropriate bowel function  She was discharged on postpartum day #1 with standard post partum instructions to follow up with her physician in 3-6 weeks for a postpartum appointment and to call with any signs of infection or bleeding  Discharge instructions: See after visit summary for complete information  Do not place anything (no partner, tampons or douche) in your vagina for 6 weeks    You may walk for exercise for the first 6 weeks then gradually return to your usual activities     Please do not drive for 1 week if you have no stitches and for 2 weeks if you have stitches or underwent a  delivery     You may take baths or shower per your preference     Please look at your breasts in the mirror daily and call for redness or tenderness or increased warmth     If you have had a  please look at your incision daily as well and call us for increasing redness or steady drainage from the incision     Please call us for temperature > 100 4*F or 38* C, worsening pain or a foul discharge  Provisions for Follow-Up Care:  See after visit summary for information related to follow-up care and any pertinent home health orders  Disposition: See After Visit Summary for discharge disposition information  Planned Readmission: No    Discharge Medications:   Prenatal vitamin daily for 6 months or the duration of nursing whichever is longer    Motrin 600 mg orally every 6 hours as needed for pain  Tylenol (over the counter) per bottle directions as needed for pain  Hydrocortisone cream 1% (over the counter) applied 1-2x daily to hemorrhoids as needed  Witch hazel pads for hemorrhoidal discomfort as needed     Ny Anderson MD  20

## 2020-07-21 NOTE — PLAN OF CARE
Problem: PAIN - ADULT  Goal: Verbalizes/displays adequate comfort level or baseline comfort level  Description  Interventions:  - Encourage patient to monitor pain and request assistance  - Assess pain using appropriate pain scale  - Administer analgesics based on type and severity of pain and evaluate response  - Implement non-pharmacological measures as appropriate and evaluate response  - Consider cultural and social influences on pain and pain management  - Notify physician/advanced practitioner if interventions unsuccessful or patient reports new pain  Outcome: Progressing     Problem: INFECTION - ADULT  Goal: Absence or prevention of progression during hospitalization  Description  INTERVENTIONS:  - Assess and monitor for signs and symptoms of infection  - Monitor lab/diagnostic results  - Monitor all insertion sites, i e  indwelling lines, tubes, and drains  - Monitor endotracheal if appropriate and nasal secretions for changes in amount and color  - [place] appropriate cooling/warming therapies per order  - Administer medications as ordered  - Instruct and encourage patient and family to use good hand hygiene technique  - Identify and instruct in appropriate isolation precautions for identified infection/condition  Outcome: Progressing  Goal: Absence of fever/infection during neutropenic period  Description  INTERVENTIONS:  - Monitor WBC    Outcome: Progressing     Problem: SAFETY ADULT  Goal: Patient will remain free of falls  Description  INTERVENTIONS:  - Assess patient frequently for physical needs  -  Identify cognitive and physical deficits and behaviors that affect risk of falls    -  [place] fall precautions as indicated by assessment   - Educate patient/family on patient safety including physical limitations  - Instruct patient to call for assistance with activity based on assessment  - Modify environment to reduce risk of injury  - Consider OT/PT consult to assist with strengthening/mobility  Outcome: Progressing  Goal: Maintain or return to baseline ADL function  Description  INTERVENTIONS:  -  Assess patient's ability to carry out ADLs; assess patient's baseline for ADL function and identify physical deficits which impact ability to perform ADLs (bathing, care of mouth/teeth, toileting, grooming, dressing, etc )  - Assess/evaluate cause of self-care deficits   - Assess range of motion  - Assess patient's mobility; develop plan if impaired  - Assess patient's need for assistive devices and provide as appropriate  - Encourage maximum independence but intervene and supervise when necessary  - Involve family in performance of ADLs  - Assess for home care needs following discharge   - Consider OT consult to assist with ADL evaluation and planning for discharge  - Provide patient education as appropriate  Outcome: Progressing  Goal: Maintain or return mobility status to optimal level  Description  INTERVENTIONS:  - Assess patient's baseline mobility status (ambulation, transfers, stairs, etc )    - Identify cognitive and physical deficits and behaviors that affect mobility  - Identify mobility aids required to assist with transfers and/or ambulation (gait belt, sit-to-stand, lift, walker, cane, etc )  - [place] fall precautions as indicated by assessment  - Record patient progress and toleration of activity level on Mobility SBAR; progress patient to next Phase/Stage  - Instruct patient to call for assistance with activity based on assessment  - Consider rehabilitation consult to assist with strengthening/weightbearing, etc   Outcome: Progressing     Problem: Knowledge Deficit  Goal: Patient/family/caregiver demonstrates understanding of disease process, treatment plan, medications, and discharge instructions  Description  Complete learning assessment and assess knowledge base    Interventions:  - Provide teaching at level of understanding  - Provide teaching via preferred learning methods  Outcome: Progressing     Problem: DISCHARGE PLANNING  Goal: Discharge to home or other facility with appropriate resources  Description  INTERVENTIONS:  - Identify barriers to discharge w/patient and caregiver  - Arrange for needed discharge resources and transportation as appropriate  - Identify discharge learning needs (meds, wound care, etc )  - Arrange for interpretive services to assist at discharge as needed  - Refer to Case Management Department for coordinating discharge planning if the patient needs post-hospital services based on physician/advanced practitioner order or complex needs related to functional status, cognitive ability, or social support system  Outcome: Progressing

## 2020-07-21 NOTE — PROGRESS NOTES
Progress Note - OB/GYN   Ines Hall 26 y o  female MRN: [id number]  Unit/Bed#: L&D 310-01 Encounter: [id number]    Assessment:  26 y o  [id number] s/p Spontaneous Vaginal Delivery Postpartum day  1  Noted elevated BPs overnight, no severe range BP necessitating treatment  Patient recovering well, Stable  If blood pressures remain stable and labs are within normal limits, anticipate discharge today  Plan:    PPD1  Continue routine post partum care  Pain management PRN  Encourage ambulation  Encourage breastfeeding    Elevated BPs overnight  BP: 110-150/50-80s  Will send CBC and CMP    Rh negative    O positive  Rhogam ordered      Subjective/Objective     Subjective:   Artist Riedel was seen at bedside this morning with Dr Kofi Hager  She reports feeling well with the exception of some cramping  She denies any headache, chest pain, shortness of breath, right upper quadrant pain, nausea, vomiting or swelling    Pain: yes, cramping, improved with meds  Tolerating PO: yes  Voiding: yes  BM: no  Ambulating: yes  Breastfeeding:  yes  Chest pain: no  Shortness of breath: no  Leg pain: no  Lochia: minimal    Objective:     Vitals: Temp:  [98 °F (36 7 °C)-98 2 °F (36 8 °C)] 98 1 °F (36 7 °C)  HR:  [51-90] 56  Resp:  [18-20] 18  BP: (105-153)/(57-87) 145/83       Intake/Output Summary (Last 24 hours) at 2020  Last data filed at 2020  Gross per 24 hour   Intake --   Output 654 ml   Net -654 ml     Physical Exam:   General: NAD, alert, oriented  Cardio: Regular rate and rhythm, no murmur  Resp: nonlabored breathing, clear to auscultation bilaterally  Abdomen: Soft, no distension/rebound/guarding/tenderness   Fundus: Firm, non-tender, fundus: at umbilicus  Lower Extremities: Non-tender, no palpable cords    Medications:  Current Facility-Administered Medications   Medication Dose Route Frequency    acetaminophen (TYLENOL) tablet 650 mg  650 mg Oral Q4H PRN    aluminum-magnesium hydroxide-simethicone (MYLANTA) 200-200-20 mg/5 mL oral suspension 15 mL  15 mL Oral Q6H PRN    benzocaine-menthol-lanolin-aloe (DERMOPLAST) 20-0 5 % topical spray   Topical 4x Daily PRN    calcium carbonate (TUMS) chewable tablet 1,000 mg  1,000 mg Oral Daily PRN    docusate sodium (COLACE) capsule 100 mg  100 mg Oral BID    famotidine (PEPCID) 20 mg/2 5 mL oral suspension 20 mg  20 mg Oral BID    ibuprofen (MOTRIN) tablet 600 mg  600 mg Oral Q6H PRN    lactated ringers infusion  125 mL/hr Intravenous Continuous    Rho(D) immune globulin (RHOGAM ULTRA-FILTERED PLUS) IM injection 300 mcg  300 mcg Intramuscular Once    ropivacaine 0 2% PCEA   Epidural Continuous    senna (SENOKOT) tablet 8 6 mg  1 tablet Oral Daily    senna 8 8 mg/5 mL oral syrup 8 8 mg  8 8 mg Oral HS    simethicone (MYLICON) chewable tablet 80 mg  80 mg Oral 4x Daily PRN    witch hazel-glycerin (TUCKS) topical pad 1 pad  1 pad Topical Q4H PRN       Labs:   Recent Results (from the past 24 hour(s))   Type and screen    Collection Time: 07/20/20  7:32 AM   Result Value Ref Range    ABO Grouping A     Rh Factor Negative     Antibody Screen Negative     Specimen Expiration Date 20200723    CBC and differential    Collection Time: 07/20/20  7:32 AM   Result Value Ref Range    WBC 11 85 (H) 4 31 - 10 16 Thousand/uL    RBC 4 15 3 81 - 5 12 Million/uL    Hemoglobin 11 2 (L) 11 5 - 15 4 g/dL    Hematocrit 34 3 (L) 34 8 - 46 1 %    MCV 83 82 - 98 fL    MCH 27 0 26 8 - 34 3 pg    MCHC 32 7 31 4 - 37 4 g/dL    RDW 13 1 11 6 - 15 1 %    MPV 10 6 8 9 - 12 7 fL    Platelets 936 973 - 935 Thousands/uL    nRBC 0 /100 WBCs    Neutrophils Relative 74 43 - 75 %    Immat GRANS % 1 0 - 2 %    Lymphocytes Relative 18 14 - 44 %    Monocytes Relative 6 4 - 12 %    Eosinophils Relative 1 0 - 6 %    Basophils Relative 0 0 - 1 %    Neutrophils Absolute 8 79 (H) 1 85 - 7 62 Thousands/µL    Immature Grans Absolute 0 08 0 00 - 0 20 Thousand/uL    Lymphocytes Absolute 2 13 0 60 - 4 47 Thousands/µL    Monocytes Absolute 0 71 0 17 - 1 22 Thousand/µL    Eosinophils Absolute 0 10 0 00 - 0 61 Thousand/µL    Basophils Absolute 0 04 0 00 - 0 10 Thousands/µL   RPR    Collection Time: 07/20/20  7:32 AM   Result Value Ref Range    RPR Non-Reactive Non-Reactive   Blood gas, arterial, cord    Collection Time: 07/20/20  2:09 PM   Result Value Ref Range    pH, Cord Art 7 166 (L) 7 230 - 7 430    pCO2, Cord Art 61 8 (H) 30 0 - 60 0    pO2, Cord Art 15 1 5 0 - 25 0 mm HG    HCO3, Cord Art 21 8 17 3 - 27 3 mmol/L    Base Exc, Cord Art -7 9 (L) 3 0 - 11 0 mmol/L    O2 Content, Cord Art 5 4 ml/dl    O2 Hgb, Arterial Cord 22 9 %   Blood gas, venous, cord    Collection Time: 07/20/20  2:09 PM   Result Value Ref Range    pH, Cord Davy 7 271 7 190 - 7 490    pCO2, Cord Davy 48 5 (H) 27 0 - 43 0 mm HG    pO2, Cord Davy 23 4 15 0 - 45 0 mm HG    HCO3, Cord Davy 21 8 12 2 - 28 6 mmol/L    Base Exc, Cord Davy -5 4 (L) 1 0 - 9 0 mmol/L    O2 Cont, Cord Davy 12 6 mL/dL    O2 HGB,VENOUS CORD 52 9 %   Postpartum Rhogam    Collection Time: 07/21/20  3:36 AM   Result Value Ref Range    ABO Grouping A     Rh Factor Negative     Antibody Screen Negative     Fetal Bleed Screen Negative          Florette Lax  7/21/2020  6:18 AM

## 2020-07-21 NOTE — LACTATION NOTE
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

## 2020-07-22 ENCOUNTER — TRANSITIONAL CARE MANAGEMENT (OUTPATIENT)
Dept: FAMILY MEDICINE CLINIC | Facility: CLINIC | Age: 30
End: 2020-07-22

## 2020-07-22 NOTE — UTILIZATION REVIEW
Notification of Maternity/Delivery & [facility] Birth Information for Admission   Notification of Maternity/Delivery for Admission to our facility [address]  Be advised that this patient was admitted to our facility under Inpatient Status  Contact Shen Nuñez at [phone] for additional admission information  [facility] DEPT  DEDICATED [phone]  Mother &  Information   Patient Name: Deborah Kim YOB: 1990   Delivering clinician: Sarah Rodriguez   OB History        4    Para   3    Term   3       0    AB   1    Living   3       SAB   1    TAB   0    Ectopic   0    Multiple   0    Live Births   3               Smith Name & MRN:   Information for the patient's :  Richard Spangler [facility]) [[id number]]     Smith Delivery Information:  Sex: female  Delivered 2020 2:07 PM by Vaginal, Spontaneous; Gestational Age: 36w0d    Smith Measurements:  Weight: 6 lb 11 2 oz (3040 g); Height: 19"    APGAR 1 minute 5 minutes 10 minutes   Totals: 8 9      Smith Birth Information: 26 y o  female MRN: [id number] Unit/Bed#: L&D 310-01 Estimated Date of Delivery: 20  Birthweight: No birth weight on file   Gestational Age: <None> Delivery Type: Vaginal, Spontaneous          APGARS  One minute Five minutes Ten minutes   Totals:                 [address]   [address]:   [place]  Tax ID: [id number]  NPI: [id number] Attending Provider/NPI:  Phone:  Address: Tiny Huerta [[id number]]  [phone]  Same as Facility   Place of Service Code: 24 Place of Service Name: [address] [place] [address]   Start Date: 20 0700   Discharge Date & Time: 2020  5:45 PM    Type of Admission: Inpatient Status Discharge Disposition (if discharged): Home/Self Care   Patient Diagnoses:   Encounter for induction of labor [Z34 90]  The encounter diagnosis was  (spontaneous vaginal delivery)  1   (spontaneous vaginal delivery)       Orders: Admission Orders (From admission, onward)     Ordered        20 0700  Inpatient Admission  Once                    Assigned Utilization Review Contact: Stacey Davenport  Utilization   Network Utilization Review Department  Phone: [phone]; Fax [phone]  Email: [email or web address]

## 2020-07-23 ENCOUNTER — TELEPHONE (OUTPATIENT)
Dept: OBGYN CLINIC | Facility: MEDICAL CENTER | Age: 30
End: 2020-07-23

## 2020-07-23 NOTE — TELEPHONE ENCOUNTER
Call to patient RE: elevated BP at time of delivery  States she feels fine    Will complete BP check at home and contact office with results

## 2020-07-24 ENCOUNTER — TELEPHONE (OUTPATIENT)
Dept: OBGYN CLINIC | Facility: CLINIC | Age: 30
End: 2020-07-24

## 2020-07-27 ENCOUNTER — TELEPHONE (OUTPATIENT)
Dept: OBGYN CLINIC | Facility: MEDICAL CENTER | Age: 30
End: 2020-07-27

## 2020-07-27 LAB — PLACENTA IN STORAGE: NORMAL

## 2020-10-07 ENCOUNTER — TELEPHONE (OUTPATIENT)
Dept: GASTROENTEROLOGY | Facility: MEDICAL CENTER | Age: 30
End: 2020-10-07

## 2020-10-07 ENCOUNTER — OFFICE VISIT (OUTPATIENT)
Dept: GASTROENTEROLOGY | Facility: MEDICAL CENTER | Age: 30
End: 2020-10-07
Payer: COMMERCIAL

## 2020-10-07 ENCOUNTER — TELEPHONE (OUTPATIENT)
Dept: GASTROENTEROLOGY | Facility: AMBULARY SURGERY CENTER | Age: 30
End: 2020-10-07

## 2020-10-07 VITALS
DIASTOLIC BLOOD PRESSURE: 92 MMHG | SYSTOLIC BLOOD PRESSURE: 137 MMHG | TEMPERATURE: 97.7 F | BODY MASS INDEX: 33.43 KG/M2 | HEART RATE: 71 BPM | WEIGHT: 208 LBS | HEIGHT: 66 IN

## 2020-10-07 DIAGNOSIS — K62.5 RECTAL BLEED: ICD-10-CM

## 2020-10-07 DIAGNOSIS — K60.2 ANAL FISSURE: Primary | ICD-10-CM

## 2020-10-07 DIAGNOSIS — K60.2 ANAL FISSURE: ICD-10-CM

## 2020-10-07 DIAGNOSIS — K59.00 CONSTIPATION, UNSPECIFIED CONSTIPATION TYPE: ICD-10-CM

## 2020-10-07 PROCEDURE — 99203 OFFICE O/P NEW LOW 30 MIN: CPT | Performed by: INTERNAL MEDICINE

## 2020-10-07 RX ORDER — POLYETHYLENE GLYCOL 3350 17 G/17G
17 POWDER, FOR SOLUTION ORAL DAILY
Qty: 255 G | Refills: 0 | Status: SHIPPED | OUTPATIENT
Start: 2020-10-07 | End: 2020-12-16 | Stop reason: ALTCHOICE

## 2020-12-16 ENCOUNTER — OFFICE VISIT (OUTPATIENT)
Dept: FAMILY MEDICINE CLINIC | Facility: CLINIC | Age: 30
End: 2020-12-16
Payer: COMMERCIAL

## 2020-12-16 DIAGNOSIS — Z00.00 WELL ADULT EXAM: Primary | ICD-10-CM

## 2020-12-16 PROCEDURE — 99395 PREV VISIT EST AGE 18-39: CPT | Performed by: FAMILY MEDICINE

## 2020-12-21 VITALS
DIASTOLIC BLOOD PRESSURE: 80 MMHG | OXYGEN SATURATION: 98 % | SYSTOLIC BLOOD PRESSURE: 120 MMHG | TEMPERATURE: 97.6 F | HEIGHT: 67 IN | WEIGHT: 209.8 LBS | BODY MASS INDEX: 32.93 KG/M2 | HEART RATE: 76 BPM

## 2020-12-21 PROBLEM — Z36.89 ENCOUNTER FOR ULTRASOUND TO CHECK FETAL GROWTH: Status: RESOLVED | Noted: 2020-05-06 | Resolved: 2020-12-21

## 2020-12-21 PROBLEM — O99.213 OBESITY AFFECTING PREGNANCY IN THIRD TRIMESTER: Status: RESOLVED | Noted: 2020-03-11 | Resolved: 2020-12-21

## 2020-12-21 PROBLEM — Z3A.39 39 WEEKS GESTATION OF PREGNANCY: Status: RESOLVED | Noted: 2020-05-06 | Resolved: 2020-12-21

## 2021-03-05 DIAGNOSIS — O92.79 POSTPARTUM GALACTOCELE: Primary | ICD-10-CM

## 2021-03-17 ENCOUNTER — HOSPITAL ENCOUNTER (OUTPATIENT)
Dept: ULTRASOUND IMAGING | Facility: CLINIC | Age: 31
Discharge: HOME | End: 2021-03-17
Payer: COMMERCIAL

## 2021-03-17 ENCOUNTER — HOSPITAL ENCOUNTER (OUTPATIENT)
Dept: MAMMOGRAPHY | Facility: CLINIC | Age: 31
Discharge: HOME | End: 2021-03-17
Payer: COMMERCIAL

## 2021-03-17 VITALS — HEIGHT: 67 IN | WEIGHT: 200 LBS | BODY MASS INDEX: 31.39 KG/M2

## 2021-03-17 DIAGNOSIS — O92.79 POSTPARTUM GALACTOCELE: ICD-10-CM

## 2021-03-17 PROCEDURE — 77065 DX MAMMO INCL CAD UNI: CPT

## 2021-03-17 PROCEDURE — 76642 ULTRASOUND BREAST LIMITED: CPT

## 2021-03-29 ENCOUNTER — IMMUNIZATIONS (OUTPATIENT)
Dept: FAMILY MEDICINE CLINIC | Facility: HOSPITAL | Age: 31
End: 2021-03-29

## 2021-03-29 DIAGNOSIS — Z23 ENCOUNTER FOR IMMUNIZATION: Primary | ICD-10-CM

## 2021-03-29 PROCEDURE — 91301 SARS-COV-2 / COVID-19 MRNA VACCINE (MODERNA) 100 MCG: CPT

## 2021-03-29 PROCEDURE — 0011A SARS-COV-2 / COVID-19 MRNA VACCINE (MODERNA) 100 MCG: CPT

## 2021-04-26 ENCOUNTER — IMMUNIZATIONS (OUTPATIENT)
Dept: FAMILY MEDICINE CLINIC | Facility: HOSPITAL | Age: 31
End: 2021-04-26

## 2021-04-26 DIAGNOSIS — Z23 ENCOUNTER FOR IMMUNIZATION: Primary | ICD-10-CM

## 2021-04-26 PROCEDURE — 0012A SARS-COV-2 / COVID-19 MRNA VACCINE (MODERNA) 100 MCG: CPT

## 2021-04-26 PROCEDURE — 91301 SARS-COV-2 / COVID-19 MRNA VACCINE (MODERNA) 100 MCG: CPT

## 2021-09-04 ENCOUNTER — APPOINTMENT (OUTPATIENT)
Dept: LAB | Facility: HOSPITAL | Age: 31
End: 2021-09-04

## 2021-09-04 DIAGNOSIS — Z00.8 HEALTH EXAMINATION IN POPULATION SURVEY: ICD-10-CM

## 2021-09-04 LAB
CHOLEST SERPL-MCNC: 159 MG/DL (ref 50–200)
EST. AVERAGE GLUCOSE BLD GHB EST-MCNC: 97 MG/DL
HBA1C MFR BLD: 5 %
HDLC SERPL-MCNC: 56 MG/DL
LDLC SERPL CALC-MCNC: 93 MG/DL (ref 0–100)
TRIGL SERPL-MCNC: 48 MG/DL

## 2021-09-04 PROCEDURE — 80061 LIPID PANEL: CPT | Performed by: FAMILY MEDICINE

## 2021-09-04 PROCEDURE — 83036 HEMOGLOBIN GLYCOSYLATED A1C: CPT | Performed by: FAMILY MEDICINE

## 2021-09-04 PROCEDURE — 36415 COLL VENOUS BLD VENIPUNCTURE: CPT | Performed by: FAMILY MEDICINE

## 2021-09-14 ENCOUNTER — TELEMEDICINE (OUTPATIENT)
Dept: FAMILY MEDICINE CLINIC | Facility: CLINIC | Age: 31
End: 2021-09-14
Payer: COMMERCIAL

## 2021-09-14 DIAGNOSIS — Z20.822 ENCOUNTER BY TELEHEALTH FOR SUSPECTED COVID-19: Primary | ICD-10-CM

## 2021-09-14 PROCEDURE — U0003 INFECTIOUS AGENT DETECTION BY NUCLEIC ACID (DNA OR RNA); SEVERE ACUTE RESPIRATORY SYNDROME CORONAVIRUS 2 (SARS-COV-2) (CORONAVIRUS DISEASE [COVID-19]), AMPLIFIED PROBE TECHNIQUE, MAKING USE OF HIGH THROUGHPUT TECHNOLOGIES AS DESCRIBED BY CMS-2020-01-R: HCPCS | Performed by: FAMILY MEDICINE

## 2021-09-14 PROCEDURE — U0005 INFEC AGEN DETEC AMPLI PROBE: HCPCS | Performed by: FAMILY MEDICINE

## 2021-09-14 PROCEDURE — 99214 OFFICE O/P EST MOD 30 MIN: CPT | Performed by: FAMILY MEDICINE

## 2021-09-14 RX ORDER — DICYCLOMINE HYDROCHLORIDE 10 MG/1
10 CAPSULE ORAL
Qty: 20 CAPSULE | Refills: 0 | Status: SHIPPED | OUTPATIENT
Start: 2021-09-14

## 2021-09-14 RX ORDER — ONDANSETRON 4 MG/1
4 TABLET, FILM COATED ORAL EVERY 8 HOURS PRN
Qty: 20 TABLET | Refills: 0 | Status: SHIPPED | OUTPATIENT
Start: 2021-09-14

## 2021-09-14 NOTE — PROGRESS NOTES
COVID-19 Outpatient Progress Note    Assessment/Plan:    Problem List Items Addressed This Visit        Other    Encounter by telehealth for suspected COVID-19 - Primary    Relevant Medications    ondansetron (ZOFRAN) 4 mg tablet    dicyclomine (BENTYL) 10 mg capsule    Other Relevant Orders    Novel Coronavirus (Covid-19),PCR [facility]         Disposition:     I referred patient to one of our centralized sites for a COVID-19 swab  I have spent 6 minutes directly with the patient  Greater than 50% of this time was spent in counseling/coordination of care regarding: instructions for management  Verification of patient location:    Patient is located in the following state in which I hold an active license [place]    Encounter provider Maria Luisa Yang MD    Provider located at [address] [place] [address] [address] [phone]    Recent Visits  No visits were found meeting these conditions  Showing recent visits within past 7 days and meeting all other requirements  Future Appointments  No visits were found meeting these conditions  Showing future appointments within next 150 days and meeting all other requirements     This virtual check-in was done via [employer] and patient was informed that this is a secure, HIPAA-compliant platform  She agrees to proceed  Patient agrees to participate in a virtual check in via telephone or video visit instead of presenting to the office to address urgent/immediate medical needs  Patient is aware this is a billable service  After connecting through [facility], the patient was identified by name and date of birth  Alexis Metz was informed that this was a telemedicine visit and that the exam was being conducted confidentially over secure lines  My office door was closed  No one else was in the room  Alexis Metz acknowledged consent and understanding of privacy and security of the telemedicine visit   I informed the patient that I have reviewed her record in Epic and presented the opportunity for her to ask any questions regarding the visit today  The patient agreed to participate  Subjective:   Colleen Dawkins is a 33 y o  female who is concerned about COVID-19  Patient's symptoms include abdominal pain, nausea and diarrhea  Patient denies fever, chills, congestion, cough and shortness of breath       COVID-19 vaccination status: Fully vaccinated    Exposure:   Contact with a person who is under investigation (PUI) for or who is positive for COVID-19 within the last 14 days?: No    Hospitalized recently for fever and/or lower respiratory symptoms?: No      Currently a healthcare worker that is involved in direct patient care?: Yes      Works in a special setting where the risk of COVID-19 transmission may be high? (this may include long-term care, correctional and [facility] facilities; homeless shelters; assisted-living facilities and group homes ): No      Resident in a special setting where the risk of COVID-19 transmission may be high? (this may include long-term care, correctional and [facility] facilities; homeless shelters; assisted-living facilities and group homes ): No      No results found for: [address] [facility] [address], [address] [facility], [address] [facility], [place] [address]  Past Medical History:   Diagnosis Date    Gestational hypertension 2020    Pregnancy with uncertain fetal viability     Last assessed - 17    Spontaneous      Varicella     childhood     Past Surgical History:   Procedure Laterality Date    DENTAL SURGERY      WISDOM TOOTH EXTRACTION       Current Outpatient Medications   Medication Sig Dispense Refill    dicyclomine (BENTYL) 10 mg capsule Take 1 capsule (10 mg total) by mouth 4 (four) times a day (before meals and at bedtime) 20 capsule 0    ondansetron (ZOFRAN) 4 mg tablet Take 1 tablet (4 mg total) by mouth every 8 (eight) hours as needed for nausea or vomiting 20 tablet 0     No current facility-administered medications for this visit  Allergies   Allergen Reactions    Other Allergic Rhinitis     enviornmental       Review of Systems   Constitutional: Negative for chills and fever  HENT: Negative for congestion  Respiratory: Negative for cough and shortness of breath  Gastrointestinal: Positive for abdominal pain, diarrhea and nausea  Objective: There were no vitals filed for this visit  Physical Exam  Constitutional:       General: She is not in acute distress  Appearance: Normal appearance  HENT:      Head: Normocephalic and atraumatic  Eyes:      General:         Right eye: No discharge  Left eye: No discharge  Extraocular Movements: Extraocular movements intact  Pulmonary:      Effort: No respiratory distress  Musculoskeletal:      Cervical back: Normal range of motion  Neurological:      General: No focal deficit present  Mental Status: She is alert and oriented to person, place, and time  Psychiatric:         Mood and Affect: Mood normal          Behavior: Behavior normal          VIRTUAL VISIT DISCLAIMER    Jonel Perez verbally agrees to participate in [employer]  Pt is aware that [employer] could be limited without vital signs or the ability to perform a full hands-on physical exam  Cori Kim understands she or the provider may request at any time to terminate the video visit and request the patient to seek care or treatment in person

## 2022-07-15 ENCOUNTER — APPOINTMENT (OUTPATIENT)
Dept: LAB | Facility: HOSPITAL | Age: 32
End: 2022-07-15

## 2022-07-15 DIAGNOSIS — Z00.8 HEALTH EXAMINATION IN POPULATION SURVEY: ICD-10-CM

## 2022-07-15 LAB
CHOLEST SERPL-MCNC: 172 MG/DL
EST. AVERAGE GLUCOSE BLD GHB EST-MCNC: 105 MG/DL
HBA1C MFR BLD: 5.3 %
HDLC SERPL-MCNC: 64 MG/DL
LDLC SERPL CALC-MCNC: 99 MG/DL (ref 0–100)
NONHDLC SERPL-MCNC: 108 MG/DL
TRIGL SERPL-MCNC: 46 MG/DL

## 2022-07-15 PROCEDURE — 36415 COLL VENOUS BLD VENIPUNCTURE: CPT

## 2022-07-15 PROCEDURE — 83036 HEMOGLOBIN GLYCOSYLATED A1C: CPT

## 2022-07-15 PROCEDURE — 80061 LIPID PANEL: CPT

## 2022-08-16 ENCOUNTER — ANNUAL EXAM (OUTPATIENT)
Dept: OBGYN CLINIC | Facility: MEDICAL CENTER | Age: 32
End: 2022-08-16
Payer: COMMERCIAL

## 2022-08-16 VITALS
SYSTOLIC BLOOD PRESSURE: 136 MMHG | WEIGHT: 196 LBS | BODY MASS INDEX: 30.76 KG/M2 | DIASTOLIC BLOOD PRESSURE: 84 MMHG | HEIGHT: 67 IN

## 2022-08-16 DIAGNOSIS — Z01.419 WELL WOMAN EXAM WITH ROUTINE GYNECOLOGICAL EXAM: Primary | ICD-10-CM

## 2022-08-16 DIAGNOSIS — L68.0 HIRSUTISM: ICD-10-CM

## 2022-08-16 PROBLEM — Z20.822 ENCOUNTER BY TELEHEALTH FOR SUSPECTED COVID-19: Status: RESOLVED | Noted: 2021-09-14 | Resolved: 2022-08-16

## 2022-08-16 PROBLEM — O44.03 PLACENTA PREVIA, THIRD TRIMESTER: Status: RESOLVED | Noted: 2020-03-11 | Resolved: 2022-08-16

## 2022-08-16 PROBLEM — O26.893 RH NEGATIVE STATUS DURING PREGNANCY IN THIRD TRIMESTER: Status: RESOLVED | Noted: 2018-02-28 | Resolved: 2022-08-16

## 2022-08-16 PROBLEM — Z67.91 RH NEGATIVE STATUS DURING PREGNANCY IN THIRD TRIMESTER: Status: RESOLVED | Noted: 2018-02-28 | Resolved: 2022-08-16

## 2022-08-16 PROCEDURE — G0145 SCR C/V CYTO,THINLAYER,RESCR: HCPCS | Performed by: NURSE PRACTITIONER

## 2022-08-16 PROCEDURE — S0612 ANNUAL GYNECOLOGICAL EXAMINA: HCPCS | Performed by: NURSE PRACTITIONER

## 2022-08-16 PROCEDURE — G0476 HPV COMBO ASSAY CA SCREEN: HCPCS | Performed by: NURSE PRACTITIONER

## 2022-08-16 NOTE — PROGRESS NOTES
Subjective      Dolly García is a 27 y o  female who presents for annual well woman exam   Last Pap smear 17 NILM  Last right breast  mammogram / US 3/17/21 birads 1  Periods are regular every 30-32 days, lasting 5 days  No intermenstrual bleeding, spotting, or discharge  Patient concerned with possible PCOS  Sister is diagnosed  Has noticed increase in facial/chin hair over the last few months  Menses are regular  Current contraception: vasectomy  History of abnormal Pap smear: no  Family history of uterine or ovarian cancer: no  Regular self breast exam: yes  History of abnormal mammogram: yes   Family history of breast cancer: yes - P  Cousin 27 y/o ( BRCA; pt's father tested negative for mutation)  History of abnormal lipids: no      Menstrual History:  OB History        4    Para   3    Term   3       0    AB   1    Living   3       SAB   1    IAB   0    Ectopic   0    Multiple   0    Live Births   1                Menarche age: 4  Patient's last menstrual period was 2022  Period Cycle (Days):  (monthly)  Period Duration (Days): 5  Period Pattern: Regular  Menstrual Flow: Moderate  Dysmenorrhea: (!) Mild (OTC as needed)  Dysmenorrhea Symptoms: Cramping    The following portions of the patient's history were reviewed and updated as appropriate: allergies, current medications, past family history, past medical history, past social history, past surgical history and problem list     Review of Systems  Pertinent items are noted in HPI        Objective      /84   Ht 5' 6 5" (1 689 m)   Wt 88 9 kg (196 lb)   LMP 2022   BMI 31 16 kg/m²     General:   alert and oriented, in no acute distress, alert, mildly obese, appears stated age and cooperative   Heart: regular rate and rhythm, S1, S2 normal, no murmur, click, rub or gallop   Lungs: clear to auscultation bilaterally   Vulva: normal, Bartholin's, Urethra, Salix's normal   Vagina: normal mucosa, normal discharge, no palpable nodules   Cervix: no bleeding following Pap, no cervical motion tenderness and no lesions   Uterus: normal size, non-tender, normal shape and consistency   Adnexa: normal adnexa and no mass, fullness, tenderness   Breast: breasts appear normal, no suspicious masses, no skin or nipple changes or axillary nodes  Assessment      @well woman@   Plan      All questions answered  Await pap smear results  Blood tests: LH, FSH, estradiol, TSH with reflex, DHEA & testosterone  Breast self exam technique reviewed and patient encouraged to perform self-exam monthly  Contraception: vasectomy  Diagnosis explained in detail, including differential   Dietary diary  Discussed healthy lifestyle modifications  Educational material distributed  Follow up in 1 Year for annual exam   Follow up as needed  Thin prep Pap smear  Breast awareness reviewed   Encouraged healthy diet, exercise and lifestyle  Encouraged follow-up with PCP as needed  Encouraged social distancing, good hand hygiene, avoidance of crowds and masking  Written information provided about COVID-19    Will call/ Breezie message with results  VBI-    BMI Counseling: Body mass index is 31 16 kg/m²  The BMI is above normal  Nutrition recommendations include reducing portion sizes, decreasing overall calorie intake and 3-5 servings of fruits/vegetables daily  Exercise recommendations include exercising 3-5 times per week, joining a gym and strength training exercises

## 2022-08-17 LAB
HPV HR 12 DNA CVX QL NAA+PROBE: NEGATIVE
HPV16 DNA CVX QL NAA+PROBE: NEGATIVE
HPV18 DNA CVX QL NAA+PROBE: NEGATIVE

## 2022-08-24 LAB
LAB AP GYN PRIMARY INTERPRETATION: NORMAL
LAB AP LMP: NORMAL
Lab: NORMAL

## 2022-09-26 NOTE — PROGRESS NOTES
Problem List Items Addressed This Visit        Other    Rh negative status during pregnancy in first trimester - Primary     Will task team to ensure we will have rhogam for patient  Prenatal care, subsequent pregnancy, second trimester     Patient doing well  Had level II - fetus did not cooperate for gender! But thinking girl  Has 32 week ultrasound scheduled  Baby moving well  No

## 2022-11-03 ENCOUNTER — APPOINTMENT (OUTPATIENT)
Dept: LAB | Facility: HOSPITAL | Age: 32
End: 2022-11-03

## 2022-11-03 DIAGNOSIS — L68.0 HIRSUTISM: ICD-10-CM

## 2022-11-03 LAB
ESTRADIOL SERPL-MCNC: 100 PG/ML
FSH SERPL-ACNC: 2.4 MIU/ML
LH SERPL-ACNC: 4.3 MIU/ML
TSH SERPL DL<=0.05 MIU/L-ACNC: 1.18 UIU/ML (ref 0.45–4.5)

## 2022-11-04 LAB — DHEA-S SERPL-MCNC: 473 UG/DL (ref 84.8–378)

## 2022-11-05 LAB
TESTOST FREE SERPL-MCNC: 3.3 PG/ML (ref 0–4.2)
TESTOST SERPL-MCNC: 49 NG/DL (ref 8–60)

## 2022-11-07 DIAGNOSIS — R79.89 ELEVATED DHEA: Primary | ICD-10-CM

## 2022-11-07 RX ORDER — SPIRONOLACTONE 50 MG/1
50 TABLET, FILM COATED ORAL DAILY
Qty: 90 TABLET | Refills: 3 | Status: SHIPPED | OUTPATIENT
Start: 2022-11-07

## 2023-01-18 ENCOUNTER — OFFICE VISIT (OUTPATIENT)
Dept: FAMILY MEDICINE CLINIC | Facility: CLINIC | Age: 33
End: 2023-01-18

## 2023-01-18 VITALS
HEIGHT: 66 IN | OXYGEN SATURATION: 98 % | HEART RATE: 66 BPM | BODY MASS INDEX: 31.5 KG/M2 | SYSTOLIC BLOOD PRESSURE: 132 MMHG | TEMPERATURE: 97 F | DIASTOLIC BLOOD PRESSURE: 82 MMHG | WEIGHT: 196 LBS

## 2023-01-18 DIAGNOSIS — J30.2 SEASONAL ALLERGIES: ICD-10-CM

## 2023-01-18 DIAGNOSIS — Z00.00 ANNUAL PHYSICAL EXAM: Primary | ICD-10-CM

## 2023-01-18 NOTE — PROGRESS NOTES
[address] [facility] PRIMARY CARE    NAME: Ashok Lopes  AGE: 27 y o  SEX: female  : 1990     DATE: 2023     Assessment and Plan:   Christopher Gonzales was seen today for new patient visit  Diagnoses and all orders for this visit:    Annual physical exam    Seasonal allergies  -     Ambulatory Referral to Allergy; Future      Problem List Items Addressed This Visit    None  Visit Diagnoses     Annual physical exam    -  Primary    Seasonal allergies        Relevant Orders    Ambulatory Referral to Allergy          Immunizations and preventive care screenings were discussed with patient today  Appropriate education was printed on patient's after visit summary  Counseling:  Alcohol/drug use: discussed moderation in alcohol intake, the recommendations for healthy alcohol use  Dental Health: discussed importance of regular tooth brushing, flossing, and dental visits  Injury prevention: discussed safety/seat belts, safety helmets, smoke detectors, carbon dioxide detectors, and smoking near bedding or upholstery  Sexual health: no issues  · Exercise: the importance of regular exercise/physical activity was discussed  Recommend exercise 3-5 times per week for at least 30 minutes  Chief Complaint:     Chief Complaint   Patient presents with   • New Patient Visit     Establish care, she would like to discuss allergy evaluation has had seasonal allergies all her life and they have now gotten worse      History of Present Illness:     Adult Annual Physical   Patient here for a comprehensive physical exam  The patient reports problems - allergies  Grew up in [facility]  Now doing homesteading  Has used Claritin  Flonase prn  Diet and Physical Activity  · Diet/Nutrition: well balanced diet  · Exercise: working  Depression Screening  PHQ-2/9 Depression Screening         General Health  · Sleep: sleeps well     · Hearing: normal - DATE OF SERVICE: 8/21/2018     Chief Complaint   Patient presents with   • Surgical Followup     post op Right reverse total shoulder DOS 7/18/18       HISTORY OF PRESENT ILLNESS:   The patient is a pleasant 66 year old female who presents for follow up 5 weeks status post surgery on her right shoulder. Her pain is dull, mild in severity.    REVIEW OF SYSTEMS:   No fevers or chills, night sweats. No other trauma.     MEDICATIONS AND ALLERGIES:   Reviewed in Epic to date.     PHYSICAL EXAMINATION:   GENERAL: Pleasant, in no acute distress. Alert and oriented x3. She appears well developed and well nourished.  MUSCULOSKELETAL: She has limited range of motion of the right shoulder. She has good range of motion of the right elbow.  NEUROLOGICAL: Some diminished sensation in axillary nerve distribution but appears grossly intact.  CARDIOVASCULAR: There is no clubbing, cyanosis or edema noted. Pulses intact.  SKIN: Warm, dry and intact. No lesions or abrasions.    DIAGNOSTIC DATA:  X-rays reviewed in [facility].    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  -   She is going to continue physical therapy.  -   She is going to work on her range of motion.  -   No restrictions with range of motion.    Follow up in 6 weeks. If there are any questions or concerns prior to the next visit, she should feel free to give our office a call.    The documentation recorded by the scribe accurately and completely reflects the service(s) I personally performed and the decisions made by me.       On 8/21/2018, Michelle FREY scribed the services personally performed by Noman Heart MD      bilateral   · Vision: most recent eye exam <1 year ago and wears contacts  · Dental: no dental visits for >1 year  /GYN Health  · Last menstrual period: normal  · Contraceptive method: vasectomy  · History of STDs?: no      Review of Systems:     Review of Systems   HENT:        Allergies   All other systems reviewed and are negative       Past Medical History:     Past Medical History:   Diagnosis Date   • Gestational hypertension 2020   • Pregnancy with uncertain fetal viability     Last assessed - 15   • Spontaneous     • Varicella     childhood      Past Surgical History:     Past Surgical History:   Procedure Laterality Date   • DENTAL SURGERY     • WISDOM TOOTH EXTRACTION        Social History:     Social History     Socioeconomic History   • Marital status: /Civil Union     Spouse name: Jose Puckett    • Number of children: 1   • Years of education: None   • Highest education level: None   Occupational History   • Occupation: MD - Resident   Tobacco Use   • Smoking status: Never   • Smokeless tobacco: Never   Vaping Use   • Vaping Use: Never used   Substance and Sexual Activity   • Alcohol use: Yes     Comment: social   • Drug use: Never   • Sexual activity: Yes     Partners: Male     Birth control/protection: Male Sterilization   Other Topics Concern   • None   Social History Narrative    Always uses seat belt    Daily caffeine consumption, 2-3 servings per day     Social Determinants of Health     Financial Resource Strain: Not on file   Food Insecurity: Not on file   Transportation Needs: Not on file   Physical Activity: Not on file   Stress: Not on file   Social Connections: Not on file   Intimate Partner Violence: Not on file   Housing Stability: Not on file      Family History:     Family History   Problem Relation Age of Onset   • Hypertension Mother    • Arthritis Mother    • Hypertension Father    • Arthritis Father    • Diabetes Father    • Hyperlipidemia Father    • Atrial fibrillation Father    • Polycystic ovary syndrome Sister    • Thyroid disease Brother    • No Known Problems Daughter    • No Known Problems Daughter    • No Known Problems Son    • Diabetes Maternal Grandmother    • Heart disease Maternal Grandfather    • Diabetes Maternal Grandfather    • Heart attack Maternal Grandfather    • Diabetes Paternal Grandmother    • Heart attack Paternal Grandfather    • Alcohol abuse Maternal Uncle    • Drug abuse Maternal Uncle    • Drug abuse Paternal Uncle    • Alcohol abuse Paternal Uncle    • Breast cancer Cousin 28        PATERNAL COUSIN   • Mental illness Neg Hx    • Colon cancer Neg Hx    • Ovarian cancer Neg Hx       Current Medications:     Current Outpatient Medications   Medication Sig Dispense Refill   • spironolactone (ALDACTONE) 50 mg tablet Take 1 tablet (50 mg total) by mouth daily 90 tablet 3   • ondansetron (ZOFRAN) 4 mg tablet Take 1 tablet (4 mg total) by mouth every 8 (eight) hours as needed for nausea or vomiting 20 tablet 0     No current facility-administered medications for this visit  Allergies: Allergies   Allergen Reactions   • Other Allergic Rhinitis     enviornmental      Physical Exam:     /82   Pulse 66   Temp (!) 97 °F (36 1 °C) (Temporal)   Ht 5' 6" (1 676 m)   Wt 88 9 kg (196 lb)   LMP 01/11/2023 (Approximate)   SpO2 98%   BMI 31 64 kg/m²     Physical Exam  Vitals and nursing note reviewed  Constitutional:       General: She is not in acute distress  Appearance: Normal appearance  She is well-developed  HENT:      Head: Normocephalic and atraumatic  Right Ear: Tympanic membrane normal       Left Ear: Tympanic membrane normal       Nose: Nose normal       Mouth/Throat:      Mouth: Mucous membranes are moist    Eyes:      Conjunctiva/sclera: Conjunctivae normal    Cardiovascular:      Rate and Rhythm: Normal rate and regular rhythm  Heart sounds: No murmur heard    Pulmonary:      Effort: Pulmonary effort is normal  No respiratory distress  Breath sounds: Normal breath sounds  Abdominal:      Palpations: Abdomen is soft  Tenderness: There is no abdominal tenderness  Musculoskeletal:         General: No swelling  Cervical back: Neck supple  Skin:     General: Skin is warm and dry  Capillary Refill: Capillary refill takes less than 2 seconds  Neurological:      Mental Status: She is alert and oriented to person, place, and time  Psychiatric:         Mood and Affect: Mood normal          Behavior: Behavior normal          Thought Content:  Thought content normal          Judgment: Judgment normal           Reather Sales, DO   [facility]

## 2023-01-18 NOTE — PATIENT INSTRUCTIONS

## 2023-08-04 DIAGNOSIS — K04.7 TOOTH INFECTION: Primary | ICD-10-CM

## 2023-08-04 RX ORDER — AMOXICILLIN AND CLAVULANATE POTASSIUM 875; 125 MG/1; MG/1
1 TABLET, FILM COATED ORAL EVERY 12 HOURS SCHEDULED
Qty: 14 TABLET | Refills: 0 | Status: SHIPPED | OUTPATIENT
Start: 2023-08-04 | End: 2023-08-11

## 2023-08-28 ENCOUNTER — APPOINTMENT (OUTPATIENT)
Dept: LAB | Facility: HOSPITAL | Age: 33
End: 2023-08-28

## 2023-08-28 DIAGNOSIS — Z00.8 ENCOUNTER FOR OTHER GENERAL EXAMINATION: ICD-10-CM

## 2023-08-28 LAB
CHOLEST SERPL-MCNC: 158 MG/DL
EST. AVERAGE GLUCOSE BLD GHB EST-MCNC: 105 MG/DL
HBA1C MFR BLD: 5.3 %
HDLC SERPL-MCNC: 66 MG/DL
LDLC SERPL CALC-MCNC: 62 MG/DL (ref 0–100)
NONHDLC SERPL-MCNC: 92 MG/DL
TRIGL SERPL-MCNC: 148 MG/DL

## 2023-08-28 PROCEDURE — 36415 COLL VENOUS BLD VENIPUNCTURE: CPT

## 2023-08-28 PROCEDURE — 80061 LIPID PANEL: CPT

## 2023-08-28 PROCEDURE — 83036 HEMOGLOBIN GLYCOSYLATED A1C: CPT

## 2024-08-25 ENCOUNTER — APPOINTMENT (OUTPATIENT)
Dept: LAB | Facility: HOSPITAL | Age: 34
End: 2024-08-25

## 2024-08-25 DIAGNOSIS — Z00.8 HEALTH EXAMINATION IN POPULATION SURVEY: ICD-10-CM

## 2024-08-25 LAB
CHOLEST SERPL-MCNC: 155 MG/DL
EST. AVERAGE GLUCOSE BLD GHB EST-MCNC: 103 MG/DL
HBA1C MFR BLD: 5.2 %
HDLC SERPL-MCNC: 55 MG/DL
LDLC SERPL CALC-MCNC: 84 MG/DL (ref 0–100)
NONHDLC SERPL-MCNC: 100 MG/DL
TRIGL SERPL-MCNC: 82 MG/DL

## 2024-08-25 PROCEDURE — 80061 LIPID PANEL: CPT

## 2024-08-25 PROCEDURE — 36415 COLL VENOUS BLD VENIPUNCTURE: CPT

## 2024-08-25 PROCEDURE — 83036 HEMOGLOBIN GLYCOSYLATED A1C: CPT

## 2024-08-26 ENCOUNTER — CLINICAL SUPPORT (OUTPATIENT)
Dept: OBGYN CLINIC | Facility: MEDICAL CENTER | Age: 34
End: 2024-08-26

## 2024-08-26 VITALS
DIASTOLIC BLOOD PRESSURE: 72 MMHG | SYSTOLIC BLOOD PRESSURE: 124 MMHG | HEIGHT: 66 IN | WEIGHT: 201 LBS | BODY MASS INDEX: 32.3 KG/M2

## 2024-08-26 DIAGNOSIS — Z00.00 WELLNESS EXAMINATION: Primary | ICD-10-CM

## 2024-08-26 PROCEDURE — NURSE

## 2024-08-26 NOTE — PROGRESS NOTES
Vitals obtained per patient's request. Patient doing well and has no current complaints. BP: 124/72.

## 2024-09-13 ENCOUNTER — TELEPHONE (OUTPATIENT)
Age: 34
End: 2024-09-13

## 2024-10-24 ENCOUNTER — TELEPHONE (OUTPATIENT)
Age: 34
End: 2024-10-24

## 2024-10-24 NOTE — TELEPHONE ENCOUNTER
Contacted patient off of Medication Management  to verify needs of services in attempts to offer patient an appointment. LVM for patient to contact intake dept  in regards to an appointment.    1st attempt.

## 2024-11-01 NOTE — TELEPHONE ENCOUNTER
Contacted patient off of Medication Management  to verify needs of services in attempts to offer patient an appointment. LVM for patient to contact intake dept  in regards to an appointment.    Second attempt. Pt removed from wait list.

## 2024-11-01 NOTE — TELEPHONE ENCOUNTER
"Behavioral Health Outpatient Intake Questions    Referred By   : Self Referral     Please advise interviewee that they need to answer all questions truthfully to allow for best care, and any misrepresentations of information may affect their ability to be seen at this clinic   => Was this discussed? Yes     If Minor Child (under age 18)    Who is/are the legal guardian(s) of the child?     Is there a custody agreement? No     If \"YES\"- Custody orders must be obtained prior to scheduling the first appointment  In addition, Consent to Treatment must be signed by all legal guardians prior to scheduling the first appointment    If \"NO\"- Consent to Treatment must be signed by all legal guardians prior to scheduling the first appointment    Behavioral Health Outpatient Intake History -     Presenting Problem (in patient's own words):     PT stated that  she ADHD and it is starting to affect day to day life.    Are there any communication barriers for this patient?     No                                               If yes, please describe barriers:  NONE   If there is a unique situation, please refer to Avni Fleming/Sisi Olivera for final determination.    Are you taking any psychiatric medications? No     If \"YES\" -What are they  NONE       If \"YES\" -Who prescribes?     Has the Patient previously received outpatient Talk Therapy or Medication Management from [facility]  No        If \"YES\"- When, Where and with Whom?         If \"NO\" -Has Patient received these services elsewhere?       If \"YES\" -When, Where, and with Whom?    Has the Patient abused alcohol or other substances in the last 6 months ? No  No concerns of substance abuse are reported.     If \"YES\" -What substance, How much, How often?     If illegal substance: Refer to [facility] (for LESLIE) or [facility].   If Alcohol in excess of 10 drinks per week:  Refer to [facility] (for LESLIE) or [facility] Off[facility]    Legal History-     Is this treatment court " "ordered? No   If \"yes \"send to :  Talk Therapy : Send to Avni Fleming/Sisi Olivera for final determination   Med Management: Send to Dr Najera for final determination     Has the Patient been convicted of a felony?  No   If \"Yes\" send to -When, What?  Talk Therapy: Send to Avni Fleming/Sisi Olivera for final determination   Med Management: Send to Dr Najera for final determination     ACCEPTED as a patient Yes  If \"Yes\" Appointment Date: 2/3/2005    Referred Elsewhere? No  If “Yes” - (Where? Ex: [facility], [facility], [facility], [facility], etc.)       Name of Insurance Co:[facility]  Insurance ID#[id number]  Insurance Phone #  If ins is primary or secondary?Priamary  If patient is a minor, parents information such as Name, D.O.B of guarantor.  "

## 2024-11-06 ENCOUNTER — TELEPHONE (OUTPATIENT)
Dept: PSYCHIATRY | Facility: CLINIC | Age: 34
End: 2024-11-06

## 2024-11-06 NOTE — TELEPHONE ENCOUNTER
Forms sent via [employer].    M requesting that forms be completed via [employer] prior to appt.

## 2024-12-17 DIAGNOSIS — K02.9 DENTAL CAVITY: Primary | ICD-10-CM

## 2025-02-03 ENCOUNTER — OFFICE VISIT (OUTPATIENT)
Dept: PSYCHIATRY | Facility: CLINIC | Age: 35
End: 2025-02-03
Payer: COMMERCIAL

## 2025-02-03 VITALS — BODY MASS INDEX: 32.14 KG/M2 | HEIGHT: 66 IN | WEIGHT: 200 LBS

## 2025-02-03 DIAGNOSIS — F90.0 ATTENTION DEFICIT HYPERACTIVITY DISORDER (ADHD), PREDOMINANTLY INATTENTIVE TYPE: Primary | ICD-10-CM

## 2025-02-03 DIAGNOSIS — Z51.81 THERAPEUTIC DRUG MONITORING: ICD-10-CM

## 2025-02-03 DIAGNOSIS — Z13.21 SCREENING FOR ENDOCRINE, NUTRITIONAL, METABOLIC AND IMMUNITY DISORDER: ICD-10-CM

## 2025-02-03 DIAGNOSIS — Z13.0 SCREENING FOR ENDOCRINE, NUTRITIONAL, METABOLIC AND IMMUNITY DISORDER: ICD-10-CM

## 2025-02-03 DIAGNOSIS — E55.9 VITAMIN D DEFICIENCY: ICD-10-CM

## 2025-02-03 DIAGNOSIS — Z13.228 SCREENING FOR ENDOCRINE, NUTRITIONAL, METABOLIC AND IMMUNITY DISORDER: ICD-10-CM

## 2025-02-03 DIAGNOSIS — Z13.29 SCREENING FOR ENDOCRINE, NUTRITIONAL, METABOLIC AND IMMUNITY DISORDER: ICD-10-CM

## 2025-02-03 PROCEDURE — 90792 PSYCH DIAG EVAL W/MED SRVCS: CPT | Performed by: STUDENT IN AN ORGANIZED HEALTH CARE EDUCATION/TRAINING PROGRAM

## 2025-02-03 RX ORDER — VITAMIN B COMPLEX
1 CAPSULE ORAL DAILY
COMMUNITY

## 2025-02-03 RX ORDER — METHYLPHENIDATE HYDROCHLORIDE 18 MG/1
18 TABLET ORAL DAILY
Qty: 30 TABLET | Refills: 0 | Status: SHIPPED | OUTPATIENT
Start: 2025-02-03

## 2025-02-03 RX ORDER — PHENOL 1.4 %
600 AEROSOL, SPRAY (ML) MUCOUS MEMBRANE 2 TIMES DAILY WITH MEALS
COMMUNITY

## 2025-02-03 NOTE — BH CRISIS PLAN
Client Name: Cori Morejon       Client YOB: 1990    [facility]      Creation Date: 2/3/25 Update Date: 2/3/25   Created By: Sloane Diaz MD Last Updated By: Sloane Diaz MD      Step 1: Warning Signs:   Warning Signs   getting panic attacks            Step 2: Internal Coping Strategies:   Internal Coping Strategies   breathing techniques   mindfulness            Step 3: People and social settings that provide distraction:   Name Contact Information    saved in cell    Places   Going to grab acup of coffee           Step 4: People whom I can ask for help during a crisis:      Name Contact Information     saved in cell      Step 5: Professionals or agencies I can contact during a crisis:      Clinican/Agency Name Phone Emergency Contact    Sloane Diaz -[phone]       [facility] Emergency Department Emergency Department Phone Emergency Department Address    [facility]          Crisis Phone Numbers:   Suicide Prevention Lifeline: Call or Text  989 Crisis Text Line: Text HOME to 099-837   Please note: Some [facility] do not have a separate number for Child/Adolescent specific crisis. If your county is not listed under Child/Adolescent, please call the adult number for your county      Adult Crisis Numbers: Child/Adolescent Crisis Numbers   [facility]: [phone] [facility]: [phone]   [facility]: [phone] [facility]: [phone]   [facility]: [phone] [place]: [phone]   [facility]: [phone] [place]: [phone]   [place]: [phone]   [facility]: [phone]   [facility]: [phone]   [facility]: [phone] (daytime) [phone] (after hours, weekends, holidays)      Step 6: Making the environment safer (plan for lethal means safety):   Plan: Firearms locked      Optional: What is most important to me and worth living  for?      [facility] Safety Plan. Angy De Los Santos and Pranav Arroyo. Used with permission of the authors.

## 2025-02-03 NOTE — BH TREATMENT PLAN
"TREATMENT PLAN (Medication Management Only)        [facility]    Name and Date of Birth:  Cori Ramonita Morejon 34 y.o. 1990  Date of Treatment Plan: February 3, 2025  Diagnosis/Diagnoses:    1. Attention deficit hyperactivity disorder (ADHD), predominantly inattentive type    2. Screening for endocrine, nutritional, metabolic and immunity disorder    3. Vitamin D deficiency      Strengths/Personal Resources for Self-Care: \"supportive family, being educated and smart\".  Area/Areas of need (in own words): \"ADHD sxs\"  1. Long Term Goal:  improve ADHD sxs .  Target Date:6 months - 8/3/2025  Person/Persons responsible for completion of goal: Cori  2.  Short Term Objective (s) - How will we reach this goal?:   A. Provider new recommended medication/dosage changes and/or continue medication(s): continue current medications as prescribed.  B. N/A.  C. N/A.  Target Date:6 months - 8/3/2025  Person/Persons Responsible for Completion of Goal: Cori  Progress Towards Goals: initiating treatment  Treatment Modality: medication management every 6 months  Review due 180 days from date of this plan: 6 months - 8/3/2025  Expected length of service: maintenance  My Physician/PA/NP and I have developed this plan together and I agree to work on the goals and objectives. I understand the treatment goals that were developed for my treatment.      "

## 2025-02-03 NOTE — PSYCH
PSYCHIATRIC EVALUATION     [facility] - [facility]    Name and Date of Birth:  Cori Morejon 34 y.o. 1990 MRN: [id number]    Date of Visit: February 3, 2025    Reason for visit:   Chief Complaint   Patient presents with    Psychiatric Evaluation    Medication Management    ADHD       Visit Time  Visit Start Time: 2:15 PM  Visit Stop Time: 3:10 PM  Total Visit Duration: 55 minutes    Assessment & Plan  Attention deficit hyperactivity disorder (ADHD), predominantly inattentive type    Orders:    methylphenidate (CONCERTA) 18 mg ER tablet; Take 1 tablet (18 mg total) by mouth daily Max Daily Amount: 18 mg    ECG 12 lead; Future    Screening for endocrine, nutritional, metabolic and immunity disorder    Orders:    Lipid panel; Future    Hemoglobin A1C; Future    TSH, 3rd generation with Free T4 reflex; Future    CBC and differential; Future    Comprehensive metabolic panel; Future    Vitamin D deficiency    Orders:    Vitamin D 25 hydroxy; Future    Therapeutic drug monitoring    Orders:    ECG 12 lead; Future      Plan   A 34 y.o.  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 Presented w/ a persistent pattern of impaired concentration and difficulty sustaining attention, failure to finish duties on time, having difficulty organizing tasks and activities, losing things, and getting easily distracted by external stimuli, figeting with hands, taking notes to keep herself busy with checking habits for compensation. She noted that her sxs has affected her function at work significantly recently since changes in work setting with more distraction.  Denied depression or manic sxs. Denied SI/HI, intent or plan  upon direct inquiry at this time. Her current presentation meets criteria for ADHD. Currently she is not at risk for suicide, homicide, self-injury, aggressive behaviors, self-neglect, or neglect of dependents or children. Given this presentation, the patient will benefit from further outpatient follow up for management of her symptoms. Started on Concerta 18 mg po daily; doses to be adjusted as indicated. Routine labs and EKG ordered.    - Start Concerta 18 mg po daily for ADHD; doses to be adjusted as indicated  - EKG  - f/u labs  - Continue vit D supplements  - Educated about healthy life style, risk of falls/sedation and addiction. Patient was receptive to education.  - Medications sent to the patient's pharmacy for 30 day supply   - RTC in 4 weeks  - The patient was educated about 24 hour and weekend coverage for urgent situations accessed by calling [facility] main practice number  - Patient was educated to call [place] Suicide Prevention Lifeline (2-673-819-NBJA [9917]) for behavioral crisis at anytime or 911 for any safety concerns, or go to nearest ER if the symptoms become overwhelming or unmanageable.    Depression Follow-up Plan Completed: Yes    Medications Risks/Benefits    Risks, Benefits And Possible Side Effects Of Medications:  Risks, benefits, and possible side effects of medications explained to Cori and she verbalizes understanding and agreement for treatment.    Controlled Medication Discussion:   PMDP reviewed. Psycho-education regarding stimulants indications, benefits, risks (including risk of addiction, pepe if taking more than prescribed), side effects (including but not limited to palpitation/arrhythmia, weight loss and increased anxiety), and alternative options provided to the patient, and the importance of the compliance with psychiatric treatment reiterated. The patient verbalized understanding and agreed to the proposed regimen.     Subjective    Cori  Ramonita Morejon is a 34 y.o.  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      The patient was visited in the clinic; chart reviewed. Presented calm, cooperative and well related, casually dressed w/ good hygiene, good eye contact, euthymic mood, full range reactive affect, talking fast with normal tone and amount, w/ linear thought process, good insight and judgement. She reported difficulties with attention and focus for most of her life which has caused some struggles but mostly manageable until recently when the [employer] started and has affected her function. She presented with a persistent pattern of inattention manifesting as:  - failure to give close attention to details, making careless mistakes in her work and activities, as overlooking and at times missing details  - having difficulties sustaining attention in her tasks, and not able to stay focused  - not able to follow the conversation and does not seem to listen when spoken to directly   - having difficulties following through on instructions and fails to finish duties as at times starts tasks but quickly loses focus and is easily sidetracked.  - having difficulty organizing tasks and activities (eg, difficulty managing sequential tasks; difficulty keeping materials and belongings in order; messy, disorganized work; has poor time management; fails to meet deadlines).  - being reluctant to engage in tasks that require sustained mental effort which leads to procrastination  - loses things necessary for tasks or activities like books, tools, wallet, keys, paperwork, eyeglasses, mobile phone which has been compensating by increased checking responses.  - gets easily distracted by extraneous  stimuli and unrelated thoughts  - is forgetful in daily activities including doing chores, running errands, returning calls/text messages, etc which has been compensating by setting multiple reminders    Also, the patient has elements of hyperactivity as fidgeting with hands, always trying to take notes to keep herself busy, but denied intense hyperactivity sxs.     Endorsed good mood, appetite and energy level.  Denied insomnia. Denied hopelessness, worthlessness or feeling guilty. Strongly denied SI/HI, intent or plan upon direct inquiry at this time. Denied A/VH. No manic sxs, paranoid ideations or fixed delusions were elicited.  Denied history of eating disorder or intense OCD sxs.  Denied smoking cigarettes, drinking alcohol or other illicit substance use.   Denied any prior h/o self-injurious behavior or SA. Denied h/o physical or sexual abuse.    PMDP reviewed. Psycho-education regarding ADHD treatment options including stimulants vs. Non-stimulants, indications, benefits, risks (including risk of addiction to stimulants medications, pepe if taking more than prescribed), side effects (including but not limited to palpitation/arrhythmia, weight loss and increased anxiety), and alternative options provided to the patient, and the importance of the compliance with psychiatric treatment reiterated. The patient verbalized understanding and agreed to the proposed regimen. Started on Concerta 18 mg po daily; doses to be adjusted as indicated. Routine labs and EKG ordered.    Review Of Systems:  Pertinent items are noted in HPI; all others are negative; no recent changes in medications or health status reported.   PHQ-2/9 Depression Screening    Little interest or pleasure in doing things: 0 - not at all  Feeling down, depressed, or hopeless: 0 - not at all  Trouble falling or staying asleep, or sleeping too much: 1 - several days  Feeling tired or having little energy: 0 - not at all  Poor appetite or overeatin -  not at all  Feeling bad about yourself - or that you are a failure or have let yourself or your family down: 0 - not at all  Trouble concentrating on things, such as reading the newspaper or watching television: 2 - more than half the days  Moving or speaking so slowly that other people could have noticed. Or the opposite - being so fidgety or restless that you have been moving around a lot more than usual: 3 - nearly every day  Thoughts that you would be better off dead, or of hurting yourself in some way: 0 - not at all  PHQ-9 Score: 6  PHQ-9 Interpretation: Mild depression         TWILA-7 Flowsheet Screening      Flowsheet Row Most Recent Value   Over the last two weeks, how often have you been bothered by the following problems?     Feeling nervous, anxious, or on edge 1   Not being able to stop or control worrying 0   Worrying too much about different things 0   Trouble relaxing  1   Being so restless that it's hard to sit still 1   Becoming easily annoyed or irritable  1   Feeling afraid as if something awful might happen 0   How difficult have these problems made it for you to do your work, take care of things at home, or get along with other people?  Somewhat difficult   TWILA Score  4            Historical Information    Past Psychiatric History:     Past Inpatient Psychiatric Treatment:   No history of past inpatient psychiatric admissions  Past Outpatient Psychiatric Treatment:    No history of past outpatient psychiatric treatment  Past Suicide Attempts: no  Past Violent Behavior: no  Past Psychiatric Medication Trials: none    Traumatic History:     Abuse: no history of physical or sexual abuse  Other Traumatic Events:  Raised in a family with exposure to people with substance abuse issues, but not being affected directly as per patient      Family Psychiatric History:     Family History   Problem Relation Age of Onset    Hypertension Mother     Arthritis Mother     Hypertension Father     Arthritis Father      Diabetes Father     Hyperlipidemia Father     Atrial fibrillation Father     Polycystic ovary syndrome Sister     Thyroid disease Brother     No Known Problems Daughter     No Known Problems Daughter     No Known Problems Son     Diabetes Maternal Grandmother     Heart disease Maternal Grandfather     Diabetes Maternal Grandfather     Heart attack Maternal Grandfather     Diabetes Paternal Grandmother     Heart attack Paternal Grandfather     Alcohol abuse Maternal Uncle     Drug abuse Maternal Uncle     Drug abuse Paternal Uncle     Alcohol abuse Paternal Uncle     Breast cancer Cousin 35        PATERNAL COUSIN    Mental illness Neg Hx     Colon cancer Neg Hx     Ovarian cancer Neg Hx        Substance Use History:    Social History     Substance and Sexual Activity   Alcohol Use Yes    Comment: social     Social History     Substance and Sexual Activity   Drug Use Never       Social History:  Developmental:  Education:  MD, finished residency on OB-GYN  Marital history:   Children: three children (4, 6 and 10 y/o)  Living arrangement, social support:  and children  Occupational History: OB-GYN physician at [employer]  Access to firearms: yes - locked and safe    Social History     Socioeconomic History    Marital status: /Civil Union     Spouse name: Rodrick     Number of children: 1    Years of education: Not on file    Highest education level: Not on file   Occupational History    Occupation: MD - Resident   Tobacco Use    Smoking status: Never    Smokeless tobacco: Never   Vaping Use    Vaping status: Never Used   Substance and Sexual Activity    Alcohol use: Yes     Comment: social    Drug use: Never    Sexual activity: Yes     Partners: Male     Birth control/protection: Male Sterilization   Other Topics Concern    Not on file   Social History Narrative    Always uses seat belt    Daily caffeine consumption, 2-3 servings per day     Social Drivers of Health     Financial Resource Strain: Not  "on file   Food Insecurity: Not on file   Transportation Needs: Not on file   Physical Activity: Not on file   Stress: Not on file   Social Connections: Not on file   Intimate Partner Violence: Not on file   Housing Stability: Not on file       Past Medical History:    Past Medical History:   Diagnosis Date    Gestational hypertension 2020    Pregnancy with uncertain fetal viability     Last assessed - 15    Spontaneous      Varicella     childhood        Past Surgical History:   Procedure Laterality Date    DENTAL SURGERY      WISDOM TOOTH EXTRACTION       Allergies   Allergen Reactions    Other Allergic Rhinitis     enviornmental       History Review:    The following portions of the patient's history were reviewed and updated as appropriate: allergies, current medications, past family history, past medical history, past social history, past surgical history, and problem list.    Objective     Vital signs in last 24 hours:    Vitals:    25 1422   Weight: 90.7 kg (200 lb)   Height: 5' 6\" (1.676 m)       Mental Status Evaluation:  Appearance and attitude: appeared as stated age, cooperative and attentive, dressed appropriately, with good hygiene  Eye contact: good  Motor Function: within normal limits, intact gait, No PMA/PMR  Gait/station: normal gait/station and normal balance  Speech:  talking fast with normal tone and amount  Language: No overt abnormality  Mood/affect: euthymic / Affect was euthymic, reactive, in full range, normal intensity and mood congruent  Thought Processes: sequential and goal-directed  Thought content: denied suicidal ideations or homicidal ideations, no overt delusions elicited  Associations: intact associations  Perceptual disturbances: denies Auditory/Visual/Tactile Hallucinations  Orientation: oriented to time, person, place and to the situational context  Cognitive Function: intact  Memory: recent and remote memory grossly intact  Intellect: average  Fund of " "knowledge: aware of current events, aware of past history, and vocabulary average  Impulse control: good  Insight/judgment: good/good          Lab Results: I have personally reviewed pertinent lab results.        WBC   Date Value Ref Range Status   07/21/2020 11.25 (H) 4.31 - 10.16 Thousand/uL Final     MCV   Date Value Ref Range Status   07/21/2020 84 82 - 98 fL Final     Lab Results   Component Value Date    BUN 8 07/21/2020    SODIUM 139 07/21/2020    CO2 27 07/21/2020     Lab Results   Component Value Date    ALKPHOS 92 07/21/2020     No results found for: \"CPK\", \"CKMB\"  No results found for: \"TSH\"  No results found for: \"INR\"  No results found for: \"APTT\"  No results found for: \"PHENO\"  Sodium   Date Value Ref Range Status   07/21/2020 139 136 - 145 mmol/L Final     BUN   Date Value Ref Range Status   07/21/2020 8 5 - 25 mg/dL Final     Creatinine   Date Value Ref Range Status   07/21/2020 0.59 (L) 0.60 - 1.30 mg/dL Final     Comment:     Standardized to IDMS reference method     TSH 3RD GENERATON   Date Value Ref Range Status   11/03/2022 1.180 0.450 - 4.500 uIU/mL Final     Comment:     The recommended reference ranges for TSH during pregnancy are as follows:   First trimester 0.1 to 2.5 uIU/mL   Second trimester  0.2 to 3.0 uIU/mL   Third trimester 0.3 to 3.0 uIU/m    Note: Normal ranges may not apply to patients who are transgender, non-binary, or whose legal sex, sex at birth, and gender identity differ.  Adult TSH (3rd generation) reference range follows the recommended guidelines of the [employer] Association, January, 2020.     WBC   Date Value Ref Range Status   07/21/2020 11.25 (H) 4.31 - 10.16 Thousand/uL Final     No components found for: \"B12\"  No results found for: \"FOLATE\"  Lab Results   Component Value Date    RPR Non-Reactive 07/20/2020       Imaging Studies: Reviewed.  No orders to display       EKG, Pathology, and Other Studies: Reviewed. and New EKG ordered.    Suicide/Homicide Risk " Assessment:    Risk of Harm to Self:  The following ratings are based on assessment at the time of the interview  Demographic risk factors include: none  Historical Risk Factors include: none  Recent Specific Risk Factors include: none  Protective Factors: no current suicidal ideation, being a parent, being , effective coping skills, good health, no substance use problems, opportunities to contribute to community, resiliency, stable living environment, stable job, sense of determination, sense of importance of health and wellness, supportive family  Weapons: gun. The following steps have been taken to ensure weapons are properly secured: locked, secured  Based on today's assessment, Cori presents the following risk of harm to self: low    Risk of Harm to Others:  The following ratings are based on assessment at the time of the interview  Demographic Risk Factors include: none.  Historical Risk Factors include: none.  Recent Specific Risk Factors include: none.  Protective Factors: no current homicidal ideation  Weapons: gun. The following steps have been taken to ensure weapons are properly secured: locked, secured  Based on today's assessment, Cori presents the following risk of harm to others: low    The following interventions are recommended: continue medication management, contracts for safety at present - agrees to go to ED if feeling unsafe, contracts for safety at present - agrees to call Crisis Intervention Service if feeling unsafe      Treatment Plan:    Completed and signed during the session: Yes - with Cori Diaz MD 02/03/25      This note was completed in part utilizing Dragon dictation Software. Grammatical, translation, syntax errors, random word insertions, spelling mistakes, and incomplete sentences may be an occasional consequence of this system secondary to software limitations with voice recognition, ambient noise, and hardware issues. If you have any questions or  concerns about the content, text, or information contained within the body of this dictation, please contact the provider for clarification.

## 2025-02-24 ENCOUNTER — APPOINTMENT (OUTPATIENT)
Dept: LAB | Facility: HOSPITAL | Age: 35
End: 2025-02-24
Payer: COMMERCIAL

## 2025-02-24 DIAGNOSIS — Z13.228 SCREENING FOR ENDOCRINE, NUTRITIONAL, METABOLIC AND IMMUNITY DISORDER: ICD-10-CM

## 2025-02-24 DIAGNOSIS — Z13.29 SCREENING FOR ENDOCRINE, NUTRITIONAL, METABOLIC AND IMMUNITY DISORDER: ICD-10-CM

## 2025-02-24 DIAGNOSIS — Z13.0 SCREENING FOR ENDOCRINE, NUTRITIONAL, METABOLIC AND IMMUNITY DISORDER: ICD-10-CM

## 2025-02-24 DIAGNOSIS — F90.0 ATTENTION DEFICIT HYPERACTIVITY DISORDER (ADHD), PREDOMINANTLY INATTENTIVE TYPE: ICD-10-CM

## 2025-02-24 DIAGNOSIS — E55.9 VITAMIN D DEFICIENCY: ICD-10-CM

## 2025-02-24 DIAGNOSIS — Z51.81 THERAPEUTIC DRUG MONITORING: ICD-10-CM

## 2025-02-24 DIAGNOSIS — Z13.21 SCREENING FOR ENDOCRINE, NUTRITIONAL, METABOLIC AND IMMUNITY DISORDER: ICD-10-CM

## 2025-02-24 LAB
25(OH)D3 SERPL-MCNC: 17.4 NG/ML (ref 30–100)
ALBUMIN SERPL BCG-MCNC: 4.7 G/DL (ref 3.5–5)
ALP SERPL-CCNC: 27 U/L (ref 34–104)
ALT SERPL W P-5'-P-CCNC: 11 U/L (ref 7–52)
ANION GAP SERPL CALCULATED.3IONS-SCNC: 5 MMOL/L (ref 4–13)
AST SERPL W P-5'-P-CCNC: 13 U/L (ref 13–39)
ATRIAL RATE: 73 BPM
BASOPHILS # BLD AUTO: 0.05 THOUSANDS/ÂΜL (ref 0–0.1)
BASOPHILS NFR BLD AUTO: 1 % (ref 0–1)
BILIRUB SERPL-MCNC: 0.39 MG/DL (ref 0.2–1)
BUN SERPL-MCNC: 16 MG/DL (ref 5–25)
CALCIUM SERPL-MCNC: 9.6 MG/DL (ref 8.4–10.2)
CHLORIDE SERPL-SCNC: 106 MMOL/L (ref 96–108)
CHOLEST SERPL-MCNC: 148 MG/DL (ref ?–200)
CO2 SERPL-SCNC: 26 MMOL/L (ref 21–32)
CREAT SERPL-MCNC: 0.83 MG/DL (ref 0.6–1.3)
EOSINOPHIL # BLD AUTO: 0.04 THOUSAND/ÂΜL (ref 0–0.61)
EOSINOPHIL NFR BLD AUTO: 1 % (ref 0–6)
ERYTHROCYTE [DISTWIDTH] IN BLOOD BY AUTOMATED COUNT: 13.2 % (ref 11.6–15.1)
EST. AVERAGE GLUCOSE BLD GHB EST-MCNC: 100 MG/DL
GFR SERPL CREATININE-BSD FRML MDRD: 91 ML/MIN/1.73SQ M
GLUCOSE P FAST SERPL-MCNC: 98 MG/DL (ref 65–99)
HBA1C MFR BLD: 5.1 %
HCT VFR BLD AUTO: 40.3 % (ref 34.8–46.1)
HDLC SERPL-MCNC: 58 MG/DL
HGB BLD-MCNC: 13.1 G/DL (ref 11.5–15.4)
IMM GRANULOCYTES # BLD AUTO: 0.03 THOUSAND/UL (ref 0–0.2)
IMM GRANULOCYTES NFR BLD AUTO: 0 % (ref 0–2)
LDLC SERPL CALC-MCNC: 75 MG/DL (ref 0–100)
LYMPHOCYTES # BLD AUTO: 1.81 THOUSANDS/ÂΜL (ref 0.6–4.47)
LYMPHOCYTES NFR BLD AUTO: 22 % (ref 14–44)
MCH RBC QN AUTO: 27.8 PG (ref 26.8–34.3)
MCHC RBC AUTO-ENTMCNC: 32.5 G/DL (ref 31.4–37.4)
MCV RBC AUTO: 85 FL (ref 82–98)
MONOCYTES # BLD AUTO: 0.56 THOUSAND/ÂΜL (ref 0.17–1.22)
MONOCYTES NFR BLD AUTO: 7 % (ref 4–12)
NEUTROPHILS # BLD AUTO: 5.85 THOUSANDS/ÂΜL (ref 1.85–7.62)
NEUTS SEG NFR BLD AUTO: 69 % (ref 43–75)
NONHDLC SERPL-MCNC: 90 MG/DL
NRBC BLD AUTO-RTO: 0 /100 WBCS
P AXIS: 66 DEGREES
PLATELET # BLD AUTO: 251 THOUSANDS/UL (ref 149–390)
PMV BLD AUTO: 10.1 FL (ref 8.9–12.7)
POTASSIUM SERPL-SCNC: 3.8 MMOL/L (ref 3.5–5.3)
PR INTERVAL: 152 MS
PROT SERPL-MCNC: 7.2 G/DL (ref 6.4–8.4)
QRS AXIS: 8 DEGREES
QRSD INTERVAL: 82 MS
QT INTERVAL: 368 MS
QTC INTERVAL: 406 MS
RBC # BLD AUTO: 4.72 MILLION/UL (ref 3.81–5.12)
SODIUM SERPL-SCNC: 137 MMOL/L (ref 135–147)
T WAVE AXIS: 56 DEGREES
TRIGL SERPL-MCNC: 77 MG/DL (ref ?–150)
TSH SERPL DL<=0.05 MIU/L-ACNC: 1.91 UIU/ML (ref 0.45–4.5)
VENTRICULAR RATE: 73 BPM
WBC # BLD AUTO: 8.34 THOUSAND/UL (ref 4.31–10.16)

## 2025-02-24 PROCEDURE — 80061 LIPID PANEL: CPT

## 2025-02-24 PROCEDURE — 93010 ELECTROCARDIOGRAM REPORT: CPT | Performed by: INTERNAL MEDICINE

## 2025-02-24 PROCEDURE — 82306 VITAMIN D 25 HYDROXY: CPT

## 2025-02-24 PROCEDURE — 36415 COLL VENOUS BLD VENIPUNCTURE: CPT

## 2025-02-24 PROCEDURE — 83036 HEMOGLOBIN GLYCOSYLATED A1C: CPT

## 2025-02-24 PROCEDURE — 84443 ASSAY THYROID STIM HORMONE: CPT

## 2025-02-24 PROCEDURE — 80053 COMPREHEN METABOLIC PANEL: CPT

## 2025-02-24 PROCEDURE — 85025 COMPLETE CBC W/AUTO DIFF WBC: CPT

## 2025-03-03 ENCOUNTER — OFFICE VISIT (OUTPATIENT)
Dept: PSYCHIATRY | Facility: CLINIC | Age: 35
End: 2025-03-03
Payer: COMMERCIAL

## 2025-03-03 VITALS — HEIGHT: 66 IN | BODY MASS INDEX: 31.02 KG/M2 | WEIGHT: 193 LBS

## 2025-03-03 DIAGNOSIS — F90.0 ATTENTION DEFICIT HYPERACTIVITY DISORDER (ADHD), PREDOMINANTLY INATTENTIVE TYPE: Primary | ICD-10-CM

## 2025-03-03 PROCEDURE — 90833 PSYTX W PT W E/M 30 MIN: CPT | Performed by: STUDENT IN AN ORGANIZED HEALTH CARE EDUCATION/TRAINING PROGRAM

## 2025-03-03 PROCEDURE — 99213 OFFICE O/P EST LOW 20 MIN: CPT | Performed by: STUDENT IN AN ORGANIZED HEALTH CARE EDUCATION/TRAINING PROGRAM

## 2025-03-03 RX ORDER — METHYLPHENIDATE HYDROCHLORIDE 27 MG/1
27 TABLET ORAL DAILY
Qty: 30 TABLET | Refills: 0 | Status: SHIPPED | OUTPATIENT
Start: 2025-03-04 | End: 2025-04-03

## 2025-03-03 NOTE — PSYCH
MEDICATION MANAGEMENT NOTE    Name: Cori Morejon      : 1990      MRN: [id number]  Encounter Provider: Sloane Diaz MD  Encounter Date: 3/3/2025   Encounter department: [facility][1]    Insurance: Payor: [employer] / Plan: [facility] NETWORK / Product Type: [facility] /      Reason for Visit:   Chief Complaint   Patient presents with    Medication Management    ADHD   :  Assessment & Plan  Attention deficit hyperactivity disorder (ADHD), predominantly inattentive type    Orders:    methylphenidate (CONCERTA) 27 MG ER tablet; Take 1 tablet (27 mg total) by mouth daily Max Daily Amount: 27 mg Do not start before 2025.        Treatment Recommendations:  A 34 y.o.  female,  (4, 6 and 10 y/o kids), domiciled w/ family, employed as an OB-GYN physician at [employer], w/ no PPH, no prior psychiatric admissions, no prior SA, no h/o self-injurious behavior, who presented to the mental health clinic for the initial intake and psychiatric evaluation for ADHD on 2/3/25. Presented w/ a persistent pattern of impaired concentration and difficulty sustaining attention, failure to finish duties on time, having difficulty organizing tasks and activities, losing things, and getting easily distracted by external stimuli, figeting with hands, taking notes to keep herself busy with checking habits for compensation. She noted that her sxs has affected her function at work significantly recently since changes in work setting with more distraction.  Denied depression or manic sxs. Denied SI/HI, intent or plan upon direct inquiry at this time. Her current presentation meets criteria for ADHD. Routine labs and EKG unremarkable (except low vit D; will continue supplements). Started on Concerta 18 mg po daily and increased to 27 mg daily on 3/3/25; doses to be adjusted as indicated.      - Increase Concerta 18 mg to 27 mg po daily for ADHD; doses to be  adjusted as indicated  - Continue vit D supplements  - Educated about healthy life style, risk of falls/sedation and addiction. Patient was receptive to education.  - Medications sent to the patient's pharmacy for 30 day supply   - RTC in 6 weeks  - The patient was educated about 24 hour and weekend coverage for urgent situations accessed by calling [facility] main practice number  - Patient was educated to call [employer] Suicide Prevention Lifeline (4-649-746-AQVU [[id number]]) for behavioral crisis at anytime or 911 for any safety concerns, or go to nearest ER if the symptoms become overwhelming or unmanageable.  Educated about diagnosis and treatment modalities. Verbalizes understanding and agreement with the treatment plan.  Discussed self monitoring of symptoms, and symptom monitoring tools.  Discussed medications and if treatment adjustment was needed or desired.  Aware of 24 hour and weekend coverage for urgent situations accessed by calling [facility] main practice number  I am scheduling this patient out for greater than 3 months: No    Medications Risks/Benefits:      Risks, Benefits And Possible Side Effects Of Medications:    Risks, benefits, and possible side effects of medications explained to Cori and she (or legal representative) verbalizes understanding and agreement for treatment.    Controlled Medication Discussion:     Cori has been filling controlled prescriptions on time as prescribed according to [place] Prescription Drug Monitoring Program  PMDP reviewed. Psycho-education regarding stimulants indications, benefits, risks (including risk of addiction, pepe if taking more than prescribed), side effects (including but not limited to palpitation/arrhythmia, weight loss and increased anxiety), and alternative options provided to the patient, and the importance of the compliance with psychiatric treatment reiterated. The patient verbalized  "understanding and agreed to the proposed regimen.       History of Present Illness     The patient was visited for Medication Management and ADHD. Presented calm, and cooperative. Reported feeling better since started Concerta. She reported better concentration and but continues to get distracted at times and gets side tracked, but has acknowledged the improvements in racing thoughts, stated: \"my mind has been calmer\".  Denied any changes in sleep, appetite, concentration, energy level, or daily activities.  Denied feelings of anhedonia, hopelessness, helplessness, worthlessness or guilt and appeared to be future oriented.  There was no thought constriction related to death.  Denied SI/HI, intent or plan upon direct inquiry at this time. No intense anxiety sxs, specific phobia or panic attacks reported. Denied AV/H.  Endorsed good compliance with the medications and denied any side effects (except mild dry mouth and some GI sxs controlled by Famotidine). Denied smoking cigarettes, binge drinking alcohol or other illicit substance use.    Given the presentation, Concerta increased to 27 mg daily and recommended to start vit D supplements (vit D: 17.4). PMDP reviewed. Psycho-education regarding stimulants indications, benefits, risks (including risk of addiction, pepe if taking more than prescribed), side effects (including but not limited to palpitation/arrhythmia, weight loss and increased anxiety), and alternative options provided to the patient, and the importance of the compliance with psychiatric treatment reiterated. The patient verbalized understanding and agreed to the proposed regimen.  The patient was educated to call 911 or go to the nearest emergency room if the symptoms become overwhelming or unable to remain in control. Verbalized understanding and agreed to seek help in case of distress or concern for safety.    Review Of Systems: A review of systems is obtained and is negative except for the pertinent " positives listed in HPI/Subjective above.      Current Rating Scores:     Not Applicable    Areas of Improvement: reviewed in HPI/Subjective Section and reviewed in Assessment and Plan Section    Past Psychiatric History: (unchanged information from previous note copied and updated)  - No inpatient psychiatric admission since last encounter  - No SA or SIB since last encounter  - No incidence of violent behavior since last encounter    Traumatic History: (unchanged information from previous note copied and updated)  - No new onset of abuse or traumatic events since last encounter      Past Medical History:   Diagnosis Date    Gestational hypertension 2020    Pregnancy with uncertain fetal viability     Last assessed - 15    Spontaneous      Varicella     childhood        Past Surgical History:   Procedure Laterality Date    DENTAL SURGERY      WISDOM TOOTH EXTRACTION       Allergies:   Allergies   Allergen Reactions    Other Allergic Rhinitis     enviornmental       Current Outpatient Medications   Medication Sig Dispense Refill    b complex vitamins capsule Take 1 capsule by mouth daily      calcium carbonate (OS-NADJA) 600 MG tablet Take 600 mg by mouth 2 (two) times a day with meals      Cholecalciferol (VITAMIN D3) 1,000 units tablet Take 1,000 Units by mouth daily      [START ON 3/4/2025] methylphenidate (CONCERTA) 27 MG ER tablet Take 1 tablet (27 mg total) by mouth daily Max Daily Amount: 27 mg Do not start before 2025. 30 tablet 0    ondansetron (ZOFRAN) 4 mg tablet Take 1 tablet (4 mg total) by mouth every 8 (eight) hours as needed for nausea or vomiting 20 tablet 0    spironolactone (ALDACTONE) 50 mg tablet Take 1 tablet (50 mg total) by mouth daily 90 tablet 3     No current facility-administered medications for this visit.       Substance Abuse History:    Social History     Substance and Sexual Activity   Alcohol Use Yes    Comment: social     Social History     Substance and  Sexual Activity   Drug Use Never       Social History:    Social History     Socioeconomic History    Marital status: /Civil Union     Spouse name: Rodrick     Number of children: 1    Years of education: Not on file    Highest education level: Not on file   Occupational History    Occupation: MD - Resident   Tobacco Use    Smoking status: Never    Smokeless tobacco: Never   Vaping Use    Vaping status: Never Used   Substance and Sexual Activity    Alcohol use: Yes     Comment: social    Drug use: Never    Sexual activity: Yes     Partners: Male     Birth control/protection: Male Sterilization   Other Topics Concern    Not on file   Social History Narrative    Always uses seat belt    Daily caffeine consumption, 2-3 servings per day     Social Drivers of Health     Financial Resource Strain: Not on file   Food Insecurity: Not on file   Transportation Needs: Not on file   Physical Activity: Not on file   Stress: Not on file   Social Connections: Not on file   Intimate Partner Violence: Not on file   Housing Stability: Not on file       Family Psychiatric History:     Family History   Problem Relation Age of Onset    Hypertension Mother     Arthritis Mother     Hypertension Father     Arthritis Father     Diabetes Father     Hyperlipidemia Father     Atrial fibrillation Father     Polycystic ovary syndrome Sister     Thyroid disease Brother     No Known Problems Daughter     No Known Problems Daughter     No Known Problems Son     Diabetes Maternal Grandmother     Heart disease Maternal Grandfather     Diabetes Maternal Grandfather     Heart attack Maternal Grandfather     Diabetes Paternal Grandmother     Heart attack Paternal Grandfather     Alcohol abuse Maternal Uncle     Drug abuse Maternal Uncle     Drug abuse Paternal Uncle     Alcohol abuse Paternal Uncle     Breast cancer Cousin 35        PATERNAL COUSIN    Mental illness Neg Hx     Colon cancer Neg Hx     Ovarian cancer Neg Hx        Medical History  "Reviewed by provider this encounter:  Tobacco  Allergies  Meds  Problems  Med Hx  Surg Hx  Fam Hx          Objective   Ht 5' 6\" (1.676 m)   Wt 87.5 kg (193 lb)   BMI 31.15 kg/m²      Mental Status Evaluation:  Appearance and attitude: appeared as stated age, cooperative and attentive, dressed appropriately, with good hygiene  Eye contact: good  Motor Function: within normal limits, intact gait, No PMA/PMR  Gait/station: normal gait/station and normal balance  Speech:  talking fast with normal amount and tone  Language: No overt abnormality  Mood/affect: euthymic / Affect was euthymic, reactive, in full range, normal intensity and mood congruent  Thought Processes: sequential and goal-directed  Thought content: denied suicidal ideations or homicidal ideations, no overt delusions elicited  Associations: intact associations  Perceptual disturbances: denies Auditory/Visual/Tactile Hallucinations  Orientation: oriented to time, person, place and to the situational context  Cognitive Function: intact  Memory: recent and remote memory grossly intact  Intellect: average  Fund of knowledge: aware of current events, aware of past history, and vocabulary average  Impulse control: good  Insight/judgment: good/good          Laboratory Results: I have personally reviewed all pertinent laboratory/tests results    Recent Labs (last 2 months):   Appointment on 02/24/2025   Component Date Value    Ventricular Rate 02/24/2025 73     Atrial Rate 02/24/2025 73     AZ Interval 02/24/2025 152     QRSD Interval 02/24/2025 82     QT Interval 02/24/2025 368     QTC Interval 02/24/2025 406     P Axis 02/24/2025 66     QRS Axis 02/24/2025 8     T Wave [place] 02/24/2025 56    Appointment on 02/24/2025   Component Date Value    Cholesterol 02/24/2025 148     Triglycerides 02/24/2025 77     HDL, Direct 02/24/2025 58     LDL Calculated 02/24/2025 75     Non-HDL-Chol (CHOL-HDL) 02/24/2025 90     Hemoglobin A1C 02/24/2025 5.1     EAG " 02/24/2025 100     TSH 3RD GENERATON 02/24/2025 1.906     WBC 02/24/2025 8.34     RBC 02/24/2025 4.72     Hemoglobin 02/24/2025 13.1     Hematocrit 02/24/2025 40.3     MCV 02/24/2025 85     MCH 02/24/2025 27.8     MCHC 02/24/2025 32.5     RDW 02/24/2025 13.2     MPV 02/24/2025 10.1     Platelets 02/24/2025 251     nRBC 02/24/2025 0     Segmented % 02/24/2025 69     Immature Grans % 02/24/2025 0     Lymphocytes % 02/24/2025 22     Monocytes % 02/24/2025 7     Eosinophils Relative 02/24/2025 1     Basophils Relative 02/24/2025 1     Absolute Neutrophils 02/24/2025 5.85     Absolute Immature Grans 02/24/2025 0.03     Absolute Lymphocytes 02/24/2025 1.81     Absolute Monocytes 02/24/2025 0.56     Eosinophils Absolute 02/24/2025 0.04     Basophils Absolute 02/24/2025 0.05     Sodium 02/24/2025 137     Potassium 02/24/2025 3.8     Chloride 02/24/2025 106     CO2 02/24/2025 26     ANION GAP 02/24/2025 5     BUN 02/24/2025 16     Creatinine 02/24/2025 0.83     Glucose, Fasting 02/24/2025 98     Calcium 02/24/2025 9.6     AST 02/24/2025 13     ALT 02/24/2025 11     Alkaline Phosphatase 02/24/2025 27 (L)     Total Protein 02/24/2025 7.2     Albumin 02/24/2025 4.7     Total Bilirubin 02/24/2025 0.39     eGFR 02/24/2025 91     Vit D, 25-Hydroxy 02/24/2025 17.4 (L)        Suicide/Homicide Risk Assessment:    Risk of Harm to Self:  The following ratings are based on assessment at the time of the interview  Demographic Risk Factors include:   Historical Risk Factors include: none  Current Specific Risk Factors include: none  Protective Factors: no current suicidal ideation  Weapons/Firearms: none. The following steps have been taken to ensure weapons are properly secured: not applicable  Based on today's assessment, Cori presents the following risk of harm to self: minimal    Risk of Harm to Others:  The following ratings are based on assessment at the time of the interview  Demographic Risk Factors include:  none  Historical Risk Factors include: none  Recent Specific Risk Factors include: none  Protective Factors: no current homicidal ideation  Weapons/Firearms: none. The following steps have been taken to ensure weapons are properly secured: not applicable  Based on today's assessment, Cori presents the following risk of harm to others: minimal    The following interventions are recommended: Continue medication management. No other intervention changes indicated at this time.    Psychotherapy Provided:     Individual psychotherapy provided: Yes    Counseling was provided during the session today for 16 minutes.  Medications, treatment progress and treatment plan reviewed with Cori.  Psychoeducation provided to the patient and was educated about the importance of compliance with the medications and psychiatric treatment  Supportive psychotherapy provided to the patient  Solution Focused Brief Therapy (SFBT) provided  Patient's emotions were validated and specific labeled praise provided.   [place] suggestions were offered in a supportive non-critical way.     Treatment Plan:    Completed and signed during the session: Not applicable - Treatment Plan not due at this session    Goals: Progress towards Treatment Plan goals - Yes, progressing, as evidenced by subjective findings in HPI/Subjective Section and in Assessment and Plan Section    Depression Follow-up Plan Completed: Not applicable    Note Share:    This note was shared with patient.    Administrative Statements       Visit Time  Visit Start Time: 1:47 PM  Visit Stop Time: 2:07 PM  Total Visit Duration:  20 minutes    Sloane Diaz MD 03/03/25    This note was completed in part utilizing Dragon dictation Software. Grammatical, translation, syntax errors, random word insertions, spelling mistakes, and incomplete sentences may be an occasional consequence of this system secondary to software limitations with voice recognition, ambient noise, and hardware  issues. If you have any questions or concerns about the content, text, or information contained within the body of this dictation, please contact the provider for clarification.

## 2025-04-07 DIAGNOSIS — F90.0 ATTENTION DEFICIT HYPERACTIVITY DISORDER (ADHD), PREDOMINANTLY INATTENTIVE TYPE: ICD-10-CM

## 2025-04-07 RX ORDER — METHYLPHENIDATE HYDROCHLORIDE 27 MG/1
27 TABLET ORAL DAILY
Qty: 30 TABLET | Refills: 0 | Status: SHIPPED | OUTPATIENT
Start: 2025-04-07 | End: 2025-04-15 | Stop reason: SDUPTHER

## 2025-04-15 ENCOUNTER — TELEMEDICINE (OUTPATIENT)
Dept: PSYCHIATRY | Facility: CLINIC | Age: 35
End: 2025-04-15
Payer: COMMERCIAL

## 2025-04-15 DIAGNOSIS — F90.0 ATTENTION DEFICIT HYPERACTIVITY DISORDER (ADHD), PREDOMINANTLY INATTENTIVE TYPE: Primary | ICD-10-CM

## 2025-04-15 PROCEDURE — 90833 PSYTX W PT W E/M 30 MIN: CPT | Performed by: STUDENT IN AN ORGANIZED HEALTH CARE EDUCATION/TRAINING PROGRAM

## 2025-04-15 PROCEDURE — 99213 OFFICE O/P EST LOW 20 MIN: CPT | Performed by: STUDENT IN AN ORGANIZED HEALTH CARE EDUCATION/TRAINING PROGRAM

## 2025-04-15 RX ORDER — METHYLPHENIDATE HYDROCHLORIDE 27 MG/1
27 TABLET ORAL DAILY
Qty: 90 TABLET | Refills: 0 | Status: SHIPPED | OUTPATIENT
Start: 2025-05-06 | End: 2025-08-04

## 2025-04-15 NOTE — PSYCH
MEDICATION MANAGEMENT NOTE    Name: Cori Morejon      : 1990      MRN: [id number]  Encounter Provider: Sloane Diaz MD  Encounter Date: 4/15/2025   Encounter department: [facility]    Insurance: Payor: [employer] / Plan: [facility] NETWORK / Product Type: [facility] /      Reason for Visit:   Chief Complaint   Patient presents with    Virtual Regular Visit    Medication Management    ADHD   :  Assessment & Plan  Attention deficit hyperactivity disorder (ADHD), predominantly inattentive type    Orders:    methylphenidate (CONCERTA) 27 MG ER tablet; Take 1 tablet (27 mg total) by mouth daily Max Daily Amount: 27 mg Do not start before May 6, 2025.        Treatment Recommendations:  A 34 y.o.  female,  (4, 6 and 10 y/o kids), domiciled w/ family, employed as an OB-GYN physician at [employer], w/ no PPH, no prior psychiatric admissions, no prior SA, no h/o self-injurious behavior, who presented to the mental health clinic for the initial intake and psychiatric evaluation for ADHD on 2/3/25. Presented w/ a persistent pattern of impaired concentration and difficulty sustaining attention, failure to finish duties on time, having difficulty organizing tasks and activities, losing things, and getting easily distracted by external stimuli, figeting with hands, taking notes to keep herself busy with checking habits for compensation. She noted that her sxs has affected her function at work significantly recently since changes in work setting with more distraction.  Denied depression or manic sxs. Denied SI/HI, intent or plan upon direct inquiry at this time. Her current presentation meets criteria for ADHD. Routine labs and EKG unremarkable (except low vit D; will continue supplements). Started on Concerta 18 mg po daily and increased to 27 mg daily on 3/3/25; doses to be adjusted as indicated.      - Continue Concerta 27 mg po daily for ADHD;  doses to be adjusted as indicated  - Continue vit D supplements  - Educated about healthy life style, risk of falls/sedation and addiction. Patient was receptive to education.  - Medications sent to the patient's pharmacy for 90 day supply    - RTC in 12 weeks  - The patient was educated about 24 hour and weekend coverage for urgent situations accessed by calling [facility] main practice number  - Patient was educated to call [employer] Suicide Prevention Lifeline (3-888-707-QKLJ [7872]) for behavioral crisis at anytime or 911 for any safety concerns, or go to nearest ER if the symptoms become overwhelming or unmanageable.  Educated about diagnosis and treatment modalities. Verbalizes understanding and agreement with the treatment plan.  Discussed self monitoring of symptoms, and symptom monitoring tools.  Discussed medications and if treatment adjustment was needed or desired.  Aware of 24 hour and weekend coverage for urgent situations accessed by calling [facility] main practice number  I am scheduling this patient out for greater than 3 months: No    Medications Risks/Benefits:      Risks, Benefits And Possible Side Effects Of Medications:    Risks, benefits, and possible side effects of medications explained to Cori and she (or legal representative) verbalizes understanding and agreement for treatment.    Controlled Medication Discussion:     Cori has been filling controlled prescriptions on time as prescribed according to [place] Prescription Drug Monitoring Program  PMDP reviewed. Psycho-education regarding stimulants indications, benefits, risks (including risk of addiction, pepe if taking more than prescribed), side effects (including but not limited to palpitation/arrhythmia, weight loss and increased anxiety), and alternative options provided to the patient, and the importance of the compliance with psychiatric treatment reiterated. The patient  verbalized understanding and agreed to the proposed regimen.       History of Present Illness     The patient was visited for Virtual Regular Visit, Medication Management, and ADHD. Presented calm, and cooperative. Reported feeling good and reported well control of her ADHD sxs with improvements in her function at work. She noted that the GI side effects of Concerta have improved since eating a snack before taking the medication in the morning. Denied any changes in sleep, appetite, concentration, energy level, or daily activities.  Denied feelings of anhedonia, hopelessness, helplessness, worthlessness or guilt and appeared to be future oriented.  There was no thought constriction related to death.  Denied SI/HI, intent or plan upon direct inquiry at this time. No intense anxiety sxs, specific phobia or panic attacks reported. Denied AV/H.  Endorsed good compliance with the medications and denied any side effects. Denied smoking cigarettes, binge drinking alcohol or other illicit substance use.    Given this presentation, medications are maintained at the same dosage.  The patient was educated to call 911 or go to the nearest emergency room if the symptoms become overwhelming or unable to remain in control. Verbalized understanding and agreed to seek help in case of distress or concern for safety.    Review Of Systems: A review of systems is obtained and is negative except for the pertinent positives listed in HPI/Subjective above.      Current Rating Scores:         Areas of Improvement: reviewed in HPI/Subjective Section and reviewed in Assessment and Plan Section    Past Medical History:   Diagnosis Date    Gestational hypertension 2020    Pregnancy with uncertain fetal viability     Last assessed - 15    Spontaneous      Varicella     childhood     Past Surgical History:   Procedure Laterality Date    DENTAL SURGERY      WISDOM TOOTH EXTRACTION       Allergies:   Allergies   Allergen Reactions     Other Allergic Rhinitis     enviornmental       Current Outpatient Medications   Medication Instructions    b complex vitamins capsule 1 capsule, Daily    calcium carbonate (OS-NADJA) 600 mg, 2 times daily with meals    Cholecalciferol (VITAMIN D3) 1,000 Units, Daily    [START ON 5/6/2025] methylphenidate (CONCERTA) 27 mg, Oral, Daily    ondansetron (ZOFRAN) 4 mg, Oral, Every 8 hours PRN    spironolactone (ALDACTONE) 50 mg, Oral, Daily        Substance Abuse History:    Tobacco, Alcohol and Drug Use History     Tobacco Use    Smoking status: Never    Smokeless tobacco: Never   Vaping Use    Vaping status: Never Used   Substance Use Topics    Alcohol use: Yes     Comment: social    Drug use: Never          Social History:    Social History     Socioeconomic History    Marital status: /Civil Union     Spouse name: Rodrick     Number of children: 1    Years of education: Not on file    Highest education level: Not on file   Occupational History    Occupation: MD - Resident   Other Topics Concern    Not on file   Social History Narrative    Always uses seat belt    Daily caffeine consumption, 2-3 servings per day        Family Psychiatric History:     Family History   Problem Relation Age of Onset    Hypertension Mother     Arthritis Mother     Hypertension Father     Arthritis Father     Diabetes Father     Hyperlipidemia Father     Atrial fibrillation Father     Polycystic ovary syndrome Sister     Thyroid disease Brother     No Known Problems Daughter     No Known Problems Daughter     No Known Problems Son     Diabetes Maternal Grandmother     Heart disease Maternal Grandfather     Diabetes Maternal Grandfather     Heart attack Maternal Grandfather     Diabetes Paternal Grandmother     Heart attack Paternal Grandfather     Alcohol abuse Maternal Uncle     Drug abuse Maternal Uncle     Drug abuse Paternal Uncle     Alcohol abuse Paternal Uncle     Breast cancer Cousin 35        PATERNAL COUSIN    Mental illness  Neg Hx     Colon cancer Neg Hx     Ovarian cancer Neg Hx        Medical History Reviewed by provider this encounter:  Tobacco  Allergies  Meds  Problems  Med Hx  Surg Hx  Fam Hx          Objective   There were no vitals taken for this visit.     Mental Status Evaluation:  Appearance and attitude: appeared as stated age, cooperative and attentive, dressed appropriately, with good hygiene  Eye contact: good  Motor Function: within normal limits, No PMA/PMR  Gait/station: Not observed  Speech: normal for rate, rhythm, volume, latency, amount  Language: No overt abnormality  Mood/affect: euthymic / Affect was euthymic, reactive, in full range, normal intensity and mood congruent  Thought Processes: sequential and goal-directed  Thought content: denied suicidal ideations or homicidal ideations, no overt delusions elicited  Associations: intact associations  Perceptual disturbances: denies Auditory/Visual/Tactile Hallucinations  Orientation: oriented to time, person, place and to the situational context  Cognitive Function: intact  Attention/Concentration: attention span and concentration are age appropriate  Memory: recent and remote memory grossly intact  Intellect: average  Fund of knowledge: aware of current events, aware of past history, and vocabulary average  Impulse control: good  Insight/judgment: good/good          Laboratory Results: I have personally reviewed all pertinent laboratory/tests results    Last Visit Labs:   No visits with results within 1 Month(s) from this visit.   Latest known visit with results is:   Appointment on 02/24/2025   Component Date Value    Ventricular Rate 02/24/2025 73     Atrial Rate 02/24/2025 73     IA Interval 02/24/2025 152     QRSD Interval 02/24/2025 82     QT Interval 02/24/2025 368     QTC Interval 02/24/2025 406     P Axis 02/24/2025 66     QRS Axis 02/24/2025 8     T [place] 02/24/2025 56        Suicide/Homicide Risk Assessment:    Risk of Harm to Self:  The  following ratings are based on assessment at the time of the interview  Based on today's assessment, Cori presents the following risk of harm to self: low    Risk of Harm to Others:  The following ratings are based on assessment at the time of the interview  Based on today's assessment, Cori presents the following risk of harm to others: low    The following interventions are recommended: Continue medication management. No other intervention changes indicated at this time.    Psychotherapy Provided:     Individual psychotherapy provided: Yes    Counseling was provided during the session today for 16 minutes.  Psychoeducation provided to the patient and was educated about the importance of compliance with the medications and psychiatric treatment  Solution Focused Brief Therapy (SFBT) provided  Patient's emotions were validated and specific labeled praise provided.   Dale suggestions were offered in a supportive non-critical way.   Cognitive Behavioral Therapy and supportive expressive interventions    Treatment Plan:    Completed and signed during the session: Not applicable - Treatment Plan not due at this session.    Goals: Progress towards Treatment Plan goals - Yes, progressing, as evidenced by subjective findings in HPI/Subjective Section and in Assessment and Plan Section    Depression Follow-up Plan Completed: Not applicable    Note Share:    This note was shared with patient.    Administrative Statements   Administrative Statements   Encounter provider Sloane Diaz MD    The Patient is located at Home and in the following state in which I hold an active license [place].    The patient was identified by name and date of birth. Cori Shipman Shayanfrankie was informed that this is a telemedicine visit and that the visit is being conducted through the Epic Embedded platform. She agrees to proceed..  My office door was closed. No one else was in the room.  She acknowledged consent and understanding of privacy  and security of the video platform. The patient has agreed to participate and understands they can discontinue the visit at any time.    I have spent a total time of 20 minutes in caring for this patient on the day of the visit/encounter including Risks and benefits of tx options, Counseling / Coordination of care, and Obtaining or reviewing history  , not including the time spent for establishing the audio/video connection.    Visit Time  Visit Start Time: 2:30 PM  Visit Stop Time: 2:50 PM  Total Visit Duration:  20 minutes    Sloane Diaz MD 04/15/25    This note was completed in part utilizing Dragon dictation Software. Grammatical, translation, syntax errors, random word insertions, spelling mistakes, and incomplete sentences may be an occasional consequence of this system secondary to software limitations with voice recognition, ambient noise, and hardware issues. If you have any questions or concerns about the content, text, or information contained within the body of this dictation, please contact the provider for clarification.

## 2025-06-04 ENCOUNTER — OFFICE VISIT (OUTPATIENT)
Dept: SURGERY | Facility: CLINIC | Age: 35
End: 2025-06-04
Payer: COMMERCIAL

## 2025-06-04 VITALS
TEMPERATURE: 97.6 F | HEIGHT: 66 IN | HEART RATE: 82 BPM | DIASTOLIC BLOOD PRESSURE: 72 MMHG | WEIGHT: 191.8 LBS | RESPIRATION RATE: 16 BRPM | SYSTOLIC BLOOD PRESSURE: 128 MMHG | BODY MASS INDEX: 30.82 KG/M2 | OXYGEN SATURATION: 99 %

## 2025-06-04 DIAGNOSIS — L08.9 OOZING SKIN INFLAMMATION: ICD-10-CM

## 2025-06-04 DIAGNOSIS — L98.9 LESION OF SUBCUTANEOUS TISSUE: Primary | ICD-10-CM

## 2025-06-04 PROCEDURE — 99204 OFFICE O/P NEW MOD 45 MIN: CPT | Performed by: SURGERY

## 2025-06-04 RX ORDER — SODIUM CHLORIDE, SODIUM LACTATE, POTASSIUM CHLORIDE, CALCIUM CHLORIDE 600; 310; 30; 20 MG/100ML; MG/100ML; MG/100ML; MG/100ML
125 INJECTION, SOLUTION INTRAVENOUS CONTINUOUS
OUTPATIENT
Start: 2025-06-04

## 2025-06-04 NOTE — ASSESSMENT & PLAN NOTE
As above.  Orders:    Case request operating room: EXCISION WIDE LESION LEFT GROIN SKIN AND SUBCUTANEOUS LESION; Standing

## 2025-06-04 NOTE — ASSESSMENT & PLAN NOTE
34F with personal history of recurrent skin and subcutaneous inflammation and infection events in the bilateral inguinal regions, now with persistent skin changes, expressible purulence, and palpable subcutaneous inflammatory lesion in the left vulvar region, no active cellulitis or fluctuance. No urgent incision and drainage indicated today. No antibiotics indicated as the site is draining and there are no signs of cellulitis or tenderness.    The patient desires definitive excision of the skin and subcutaneous finding for definitive diagnosis and therapeutic benefit. We are in agreement to proceed in the OR under sedation and local.     Informed consent obtained, discussed risks and benefits including bleeding, infection, wound healing difficulties, need for further procedures, recurrence.    Final pathology may guide further recommendations in terms of specific dermatologic management or follow up.    The patient will call me prior to OR with any worsening of her local symptoms.    Orders:    Case request operating room: EXCISION WIDE LESION LEFT GROIN SKIN AND SUBCUTANEOUS LESION; Standing

## 2025-06-04 NOTE — PROGRESS NOTES
Name: Cori Morejon      : 1990      MRN: [id number]  Encounter Provider: Alexandre Zaman MD  Encounter Date: 2025   Encounter department: [facility]  :  Assessment & Plan  Lesion of subcutaneous tissue  34F with personal history of recurrent skin and subcutaneous inflammation and infection events in the bilateral inguinal regions, now with persistent skin changes, expressible purulence, and palpable subcutaneous inflammatory lesion in the left vulvar region, no active cellulitis or fluctuance. No urgent incision and drainage indicated today. No antibiotics indicated as the site is draining and there are no signs of cellulitis or tenderness.    The patient desires definitive excision of the skin and subcutaneous finding for definitive diagnosis and therapeutic benefit. We are in agreement to proceed in the OR under sedation and local.     Informed consent obtained, discussed risks and benefits including bleeding, infection, wound healing difficulties, need for further procedures, recurrence.    Final pathology may guide further recommendations in terms of specific dermatologic management or follow up.    The patient will call me prior to OR with any worsening of her local symptoms.    Orders:    Case request operating room: EXCISION WIDE LESION LEFT GROIN SKIN AND SUBCUTANEOUS LESION; Standing    Oozing skin inflammation  As above.  Orders:    Case request operating room: EXCISION WIDE LESION LEFT GROIN SKIN AND SUBCUTANEOUS LESION; Standing          History of Present Illness   Cori Morejon is a 34 y.o. female with prior recurrent inguinal and vulvar skin and subcutaneous tissue inflammation/infection events. Possible hidradenitis versus follicultis versus inclusion cysts. Has a history of acne was following with dermatology for that. Prior R ear lesion removed, some atypical cells present, has not come back. No personal history of skin cancer. Prior  "inflammation events in the skin have always healed and resolved with incision and drainage or hot compresses.     Current area in left groin/vulva has been persistent for 4-6 months. Was infected initially, underwent I and D, and never entirely healed or resolved. There are some draining sinuses present with skin thinning. The area is overall nontender but has not fully healed or dried up. Had some antibiotics for a dental infection, they did not improve the groin area. There is a palpable firmness deep to the skin lesion and medial to it which is mobile and has not changed in multiple months, although has not resolved.    Paternal aunt had stomach cancer, paternal first cousin with breast cancer in her 30's, BRCA carrier. The patient's father was tested and is negative for BRCA. No family history of melanoma or skin cancers.  History of Present Illness    Review of Systems   Constitutional: Negative.    Skin:  Positive for color change and wound.        Left groin skin inflammation and palpable change   Hematological: Negative.    All other systems reviewed and are negative.   as per HPI.  Past Medical History   Past Medical History[1]  Past Surgical History[2]  Family History[3]   reports that she has never smoked. She has never used smokeless tobacco. She reports current alcohol use. She reports that she does not use drugs.  Current Outpatient Medications   Medication Instructions    b complex vitamins capsule 1 capsule, Oral, Daily    calcium carbonate (OS-NADJA) 600 mg, Oral, 2 times daily with meals    Cholecalciferol (VITAMIN D3) 1,000 Units, Oral, Daily    methylphenidate (CONCERTA) 27 mg, Oral, Daily    ondansetron (ZOFRAN) 4 mg, Oral, Every 8 hours PRN   Allergies[4]   Medications Ordered Prior to Encounter[5]   Social History[6]     Objective   /72 (BP Location: Right arm, Patient Position: Sitting, Cuff Size: Adult)   Pulse 82   Temp 97.6 °F (36.4 °C) (Temporal)   Resp 16   Ht 5' 6\" (1.676 m)   " Wt 87 kg (191 lb 12.8 oz)   SpO2 99%   BMI 30.96 kg/m²      Physical Exam  Vitals reviewed.   Constitutional:       General: She is not in acute distress.     Appearance: She is not toxic-appearing.   HENT:      Head: Normocephalic.      Nose: No congestion.      Mouth/Throat:      Mouth: Mucous membranes are moist.     Eyes:      Pupils: Pupils are equal, round, and reactive to light.       Cardiovascular:      Rate and Rhythm: Normal rate.   Pulmonary:      Effort: Pulmonary effort is normal. No respiratory distress.   Abdominal:      Palpations: Abdomen is soft.     Musculoskeletal:         General: Normal range of motion.      Cervical back: Normal range of motion.     Skin:     General: Skin is warm.      Capillary Refill: Capillary refill takes less than 2 seconds.      Findings: Lesion present.      Comments: Area of chronic inflammatory change and skin thinning in the left inguinal region on the left vulva with some draining sinuses and expressible purulence, no surrounding cellulitis.    Medial to the skin change and deeper is a palpable, mobile, nontender subcutaneous lesion which may be related to the nearby superficial skin changes and purulent drainage     Neurological:      General: No focal deficit present.      Mental Status: She is alert. Mental status is at baseline.     Psychiatric:         Mood and Affect: Mood normal.          Administrative Statements   I have spent a total time of 30 minutes in caring for this patient on the day of the visit/encounter including Prognosis, Risks and benefits of tx options, Instructions for management, Patient and family education, Impressions, Counseling / Coordination of care, Documenting in the medical record, Reviewing/placing orders in the medical record (including tests, medications, and/or procedures), and Obtaining or reviewing history  .       [1]   Past Medical History:  Diagnosis Date    Allergic 2004    Seasonal    Gestational hypertension  2020    Pregnancy with uncertain fetal viability     Last assessed - 15    Spontaneous      Varicella     childhood   [2]   Past Surgical History:  Procedure Laterality Date    DENTAL SURGERY      WISDOM TOOTH EXTRACTION     [3]   Family History  Problem Relation Name Age of Onset    Hypertension Mother Griselda     Arthritis Mother Griselda         S/P TKR    Hypertension Father Rodrick     Arthritis Father Rodrick         S/P TKR    Diabetes Father Rodrick         Prediabetes    Hyperlipidemia Father Rodrick     Atrial fibrillation Father Rodrick     Polycystic ovary syndrome Sister Lisa     Hyperlipidemia Sister Lisa         PCOS    Thyroid disease Brother Farhan     No Known Problems Daughter      No Known Problems Daughter      No Known Problems Son      Diabetes Maternal Grandmother      Heart disease Maternal Grandfather Grandpop         “murmur”    Diabetes Maternal Grandfather Grandpop     Heart attack Maternal Grandfather Grandpop     Diabetes Paternal Grandmother Mom mom     Heart attack Paternal Grandfather Po[place]     Heart disease Paternal Grandfather [place]         Fatal MI in 50s    Alcohol abuse Maternal Uncle Decline     Drug abuse Maternal Uncle Decline     Substance Abuse Maternal Uncle Decline     Drug abuse Paternal Uncle Decline     Alcohol abuse Paternal Uncle Decline     Substance Abuse Paternal Uncle Decline     Breast cancer Cousin Yarely 35        35    Mental illness Neg Hx      Colon cancer Neg Hx      Ovarian cancer Neg Hx     [4]   Allergies  Allergen Reactions    Other Allergic Rhinitis     enviornmental   [5]   Current Outpatient Medications on File Prior to Visit   Medication Sig Dispense Refill    b complex vitamins capsule Take 1 capsule by mouth in the morning.      calcium carbonate (OS-NADJA) 600 MG tablet Take 600 mg by mouth in the morning and 600 mg in the evening. Take with meals.      Cholecalciferol (VITAMIN D3) 1,000 units tablet Take 1,000 Units by mouth in the  morning.      methylphenidate (CONCERTA) 27 MG ER tablet Take 1 tablet (27 mg total) by mouth daily Max Daily Amount: 27 mg Do not start before May 6, 2025. 90 tablet 0    ondansetron (ZOFRAN) 4 mg tablet Take 1 tablet (4 mg total) by mouth every 8 (eight) hours as needed for nausea or vomiting 20 tablet 0    [DISCONTINUED] spironolactone (ALDACTONE) 50 mg tablet Take 1 tablet (50 mg total) by mouth daily 90 tablet 3     No current facility-administered medications on file prior to visit.   [6]   Social History  Tobacco Use    Smoking status: Never    Smokeless tobacco: Never   Vaping Use    Vaping status: Never Used   Substance and Sexual Activity    Alcohol use: Yes     Comment: social    Drug use: Never    Sexual activity: Yes     Partners: Male     Birth control/protection: Male Sterilization

## 2025-06-05 ENCOUNTER — TELEPHONE (OUTPATIENT)
Dept: SURGERY | Facility: CLINIC | Age: 35
End: 2025-06-05

## 2025-07-08 ENCOUNTER — TELEMEDICINE (OUTPATIENT)
Dept: PSYCHIATRY | Facility: CLINIC | Age: 35
End: 2025-07-08
Payer: COMMERCIAL

## 2025-07-08 DIAGNOSIS — F90.0 ATTENTION DEFICIT HYPERACTIVITY DISORDER (ADHD), PREDOMINANTLY INATTENTIVE TYPE: Primary | ICD-10-CM

## 2025-07-08 PROCEDURE — 90833 PSYTX W PT W E/M 30 MIN: CPT | Performed by: STUDENT IN AN ORGANIZED HEALTH CARE EDUCATION/TRAINING PROGRAM

## 2025-07-08 PROCEDURE — 99213 OFFICE O/P EST LOW 20 MIN: CPT | Performed by: STUDENT IN AN ORGANIZED HEALTH CARE EDUCATION/TRAINING PROGRAM

## 2025-07-08 RX ORDER — METHYLPHENIDATE HYDROCHLORIDE 27 MG/1
27 TABLET ORAL DAILY
Qty: 90 TABLET | Refills: 0 | Status: SHIPPED | OUTPATIENT
Start: 2025-08-04 | End: 2025-11-02

## 2025-07-08 NOTE — BH TREATMENT PLAN
"Treatment Plan done but not signed at time of office visit due to:  Plan reviewed with the patient during the virtual visit and verbal consent given.    TREATMENT PLAN (Medication Management Only)        [facility]    Name and Date of Birth:  Cori Morejon 35 y.o. 1990  Date of Treatment Plan: July 8, 2025  Diagnosis/Diagnoses:    1. Attention deficit hyperactivity disorder (ADHD), predominantly inattentive type      Strengths/Personal Resources for Self-Care: \"supportive family, being educated and smart\".  Area/Areas of need (in own words): \"ADHD sxs\"  1. Long Term Goal: improve ADHD sxs.  Target Date:6 months - 1/8/2026  Person/Persons responsible for completion of goal: Cori  2.  Short Term Objective (s) - How will we reach this goal?:   A. Provider new recommended medication/dosage changes and/or continue medication(s): continue current medications as prescribed.  B. N/A.  C. N/A.  Target Date:6 months - 1/8/2026  Person/Persons Responsible for Completion of Goal: Cori  Progress Towards Goals: initiating treatment  Treatment Modality: medication management every 6 months  Review due 180 days from date of this plan: 6 months - 1/8/2026  Expected length of service: maintenance  My Physician/PA/NP and I have developed this plan together and I agree to work on the goals and objectives. I understand the treatment goals that were developed for my treatment.      "

## 2025-07-08 NOTE — PSYCH
MEDICATION MANAGEMENT NOTE    Name: Cori Morejon      : 1990      MRN: [id number]  Encounter Provider: Sloane Diaz MD  Encounter Date: 2025   Encounter department: [facility]    Insurance: Payor: [employer] / Plan: [facility] NETWORK / Product Type: [facility] /      Reason for Visit:   Chief Complaint   Patient presents with    Virtual Regular Visit    Medication Management    ADHD   :  Assessment & Plan  Attention deficit hyperactivity disorder (ADHD), predominantly inattentive type    Orders:    methylphenidate (CONCERTA) 27 MG ER tablet; Take 1 tablet (27 mg total) by mouth daily Max Daily Amount: 27 mg Do not start before 2025.        Treatment Recommendations:  A 34 y.o.  female,  (4, 6 and 10 y/o kids), domiciled w/ family, employed as an OB-GYN physician at [employer], w/ no PPH, no prior psychiatric admissions, no prior SA, no h/o self-injurious behavior, who presented to the mental health clinic for the initial intake and psychiatric evaluation for ADHD on 2/3/25. Presented w/ a persistent pattern of impaired concentration and difficulty sustaining attention, failure to finish duties on time, having difficulty organizing tasks and activities, losing things, and getting easily distracted by external stimuli, figeting with hands, taking notes to keep herself busy with checking habits for compensation. She noted that her sxs has affected her function at work significantly recently since changes in work setting with more distraction.  Denied depression or manic sxs. Denied SI/HI, intent or plan upon direct inquiry at this time. Her current presentation meets criteria for ADHD. Routine labs and EKG unremarkable (except low vit D; will continue supplements). Started on Concerta 18 mg po daily and increased to 27 mg daily on 3/3/25; doses to be adjusted as indicated.      - Continue Concerta 27 mg po daily for ADHD;  doses to be adjusted as indicated  - Continue vit D supplements  - Educated about healthy life style, risk of falls/sedation and addiction. Patient was receptive to education.  - Medications sent to the patient's pharmacy for 90 day supply    - RTC in 24 weeks  - The patient was educated about 24 hour and weekend coverage for urgent situations accessed by calling [facility] main practice number  - Patient was educated to call [employer] Suicide Prevention Lifeline (7-690-224-TRQI [5314]) for behavioral crisis at anytime or 911 for any safety concerns, or go to nearest ER if the symptoms become overwhelming or unmanageable.  Educated about diagnosis and treatment modalities. Verbalizes understanding and agreement with the treatment plan.  Discussed self monitoring of symptoms, and symptom monitoring tools.  Discussed medications and if treatment adjustment was needed or desired.  Aware of 24 hour and weekend coverage for urgent situations accessed by calling [facility] main practice number  I am scheduling this patient out for greater than 3 months: Yes - Patient's stability of symptoms warrant this length of time or no significant changes to treatment plan    Medications Risks/Benefits:      Risks, Benefits And Possible Side Effects Of Medications:    Risks, benefits, and possible side effects of medications explained to Cori and she (or legal representative) verbalizes understanding and agreement for treatment.    Controlled Medication Discussion:     Cori has been filling controlled prescriptions on time as prescribed according to [place] Prescription Drug Monitoring Program  PMDP reviewed. Psycho-education regarding stimulants indications, benefits, risks (including risk of addiction, pepe if taking more than prescribed), side effects (including but not limited to palpitation/arrhythmia, weight loss and increased anxiety), and alternative options provided to the  patient, and the importance of the compliance with psychiatric treatment reiterated. The patient verbalized understanding and agreed to the proposed regimen.       History of Present Illness     The patient was visited for Virtual Regular Visit, Medication Management, and ADHD. Presented calm, and cooperative. Reported feeling good and stable. Denied any changes in sleep, appetite, concentration, energy level, or daily activities.  Denied feelings of anhedonia, hopelessness, helplessness, worthlessness or guilt and appeared to be future oriented.  There was no thought constriction related to death.  Denied SI/HI, intent or plan upon direct inquiry at this time. No intense anxiety sxs, specific phobia or panic attacks reported. Endorsed good control of ADHD sxs with the current regimen, and denied any side effects or increased anxiety sxs. Denied AV/H.  Endorsed good compliance with the medications and denied any side effects. Denied smoking cigarettes, binge drinking alcohol or other illicit substance use.    Given this presentation, medications are maintained at the same dosage.  PMDP reviewed. Psycho-education regarding stimulants indications, benefits, risks (including risk of addiction, pepe if taking more than prescribed), side effects (including but not limited to palpitation/arrhythmia, weight loss and increased anxiety), and alternative options provided to the patient, and the importance of the compliance with psychiatric treatment reiterated. The patient verbalized understanding and agreed to the proposed regimen.  The patient was educated to call 911 or go to the nearest emergency room if the symptoms become overwhelming or unable to remain in control. Verbalized understanding and agreed to seek help in case of distress or concern for safety.    Review Of Systems: A review of systems is obtained and is negative except for the pertinent positives listed in HPI/Subjective above.      Current Rating Scores:      Not Applicable    Areas of Improvement: reviewed in HPI/Subjective Section and reviewed in Assessment and Plan Section    Past Medical History[1]  Past Surgical History[2]  Allergies: Allergies[3]    Current Outpatient Medications   Medication Instructions    b complex vitamins capsule 1 capsule, Oral, Daily    calcium carbonate (OS-NADJA) 600 mg, Oral, 2 times daily with meals    Cholecalciferol (VITAMIN D3) 1,000 Units, Oral, Daily    methylphenidate (CONCERTA) 27 mg, Oral, Daily    ondansetron (ZOFRAN) 4 mg, Oral, Every 8 hours PRN       Substance Abuse History:    Tobacco, Alcohol and Drug Use History     Tobacco Use    Smoking status: Never    Smokeless tobacco: Never   Vaping Use    Vaping status: Never Used   Substance Use Topics    Alcohol use: Yes     Comment: social    Drug use: Never          Social History:    Social History     Socioeconomic History    Marital status: /Civil Union     Spouse name: Rodrick     Number of children: 1    Years of education: Not on file    Highest education level: Not on file   Occupational History    Occupation: MD - Resident   Other Topics Concern    Not on file   Social History Narrative    Always uses seat belt    Daily caffeine consumption, 2-3 servings per day        Family Psychiatric History:     Family History[4]    Medical History Reviewed by provider this encounter:  Tobacco  Allergies  Meds  Problems  Med Hx  Surg Hx  Fam Hx          Objective   There were no vitals taken for this visit.     Mental Status Evaluation:  Appearance and attitude: appeared as stated age, dressed in scubs, cooperative and attentive, with good hygiene  Eye contact: good  Motor Function: within normal limits, No PMA/PMR  Gait/station: Not observed  Speech: normal for rate, rhythm, volume, latency, amount  Language: No overt abnormality  Mood/affect: euthymic / Affect was euthymic, reactive, in full range, normal intensity and mood congruent  Thought Processes: sequential  and goal-directed  Thought content: denied suicidal ideations or homicidal ideations, no overt delusions elicited  Associations: intact associations  Perceptual disturbances: denies Auditory/Visual/Tactile Hallucinations  Orientation: oriented to time, person, place and to the situational context  Cognitive Function: intact  Attention/Concentration: attention span and concentration are age appropriate  Memory: recent and remote memory grossly intact  Intellect: average  Fund of knowledge: aware of current events, aware of past history, and vocabulary average  Impulse control: good  Insight/judgment: good/good          Laboratory Results: I have personally reviewed all pertinent laboratory/tests results    Last Visit Labs:   No visits with results within 1 Month(s) from this visit.   Latest known visit with results is:   Appointment on 02/24/2025   Component Date Value    Ventricular Rate 02/24/2025 73     Atrial Rate 02/24/2025 73     RI Interval 02/24/2025 152     QRSD Interval 02/24/2025 82     QT Interval 02/24/2025 368     QTC Interval 02/24/2025 406     P Axis 02/24/2025 66     QRS Axis 02/24/2025 8     T Wave [place] 02/24/2025 56        Suicide/Homicide Risk Assessment:    Risk of Harm to Self:  The following ratings are based on assessment at the time of the interview  Based on today's assessment, Cori presents the following risk of harm to self: low    Risk of Harm to Others:  The following ratings are based on assessment at the time of the interview  Based on today's assessment, Cori presents the following risk of harm to others: low    The following interventions are recommended: Continue medication management. No other intervention changes indicated at this time.    Psychotherapy Provided:     Individual psychotherapy provided: Yes    Counseling was provided during the session today for 16 minutes.  Psychoeducation provided to the patient and was educated about the importance of compliance with the  medications and psychiatric treatment  Solution Focused Brief Therapy (SFBT) provided  Patient's emotions were validated and specific labeled praise provided.   [place] suggestions were offered in a supportive non-critical way.   Cognitive Behavioral Therapy and supportive expressive interventions    Treatment Plan:    Completed and signed during the session: Yes - with Cori.    Goals: Progress towards Treatment Plan goals - Yes, progressing, as evidenced by subjective findings in HPI/Subjective Section and in Assessment and Plan Section    Depression Follow-up Plan Completed: Not applicable    Note Share:    This note was shared with patient.    Administrative Statements   Administrative Statements   Encounter provider Sloane Diaz MD    The Patient is located at Home and in the following state in which I hold an active license [place].    The patient was identified by name and date of birth. Cori Ramonita Toris was informed that this is a telemedicine visit and that the visit is being conducted through the Epic Embedded platform. She agrees to proceed..  My office door was closed. No one else was in the room.  She acknowledged consent and understanding of privacy and security of the video platform. The patient has agreed to participate and understands they can discontinue the visit at any time.    I have spent a total time of 18 minutes in caring for this patient on the day of the visit/encounter including Risks and benefits of tx options, Counseling / Coordination of care, and Obtaining or reviewing history  , not including the time spent for establishing the audio/video connection.    Visit Time  Visit Start Time: 1:00 PM  Visit Stop Time: 1:18 PM  Total Visit Duration: 18 minutes        Sloane Diaz MD 07/08/25         [1]   Past Medical History:  Diagnosis Date    Allergic 2004    Seasonal    Gestational hypertension 07/21/2020    Pregnancy with uncertain fetal viability     Last assessed - 9/14/17     Spontaneous      Varicella     childhood   [2]   Past Surgical History:  Procedure Laterality Date    DENTAL SURGERY      WISDOM TOOTH EXTRACTION     [3]   Allergies  Allergen Reactions    Other Allergic Rhinitis     enviornmental   [4]   Family History  Problem Relation Name Age of Onset    Hypertension Mother Griselda     Arthritis Mother Griselda         S/P TKR    Hypertension Father Rodrick     Arthritis Father Rodrick         S/P TKR    Diabetes Father Rodrick         Prediabetes    Hyperlipidemia Father Rodrick     Atrial fibrillation Father Rodrick     Polycystic ovary syndrome Sister Lisa     Hyperlipidemia Sister Lisa         PCOS    Thyroid disease Brother Farhan     No Known Problems Daughter      No Known Problems Daughter      No Known Problems Son      Diabetes Maternal Grandmother      Heart disease Maternal Grandfather Grandpop         “murmur”    Diabetes Maternal Grandfather Grandpop     Heart attack Maternal Grandfather Grandpop     Diabetes Paternal Grandmother Mom mom     Heart attack Paternal Grandfather [place]     Heart disease Paternal Grandfather [place]         Fatal MI in 50s    Alcohol abuse Maternal Uncle Decline     Drug abuse Maternal Uncle Decline     Substance Abuse Maternal Uncle Decline     Drug abuse Paternal Uncle Decline     Alcohol abuse Paternal Uncle Decline     Substance Abuse Paternal Uncle Decline     Breast cancer Cousin Yarely 35        35    Mental illness Neg Hx      Colon cancer Neg Hx      Ovarian cancer Neg Hx

## 2025-07-15 ENCOUNTER — ANESTHESIA EVENT (OUTPATIENT)
Dept: PERIOP | Facility: HOSPITAL | Age: 35
End: 2025-07-15
Payer: COMMERCIAL

## 2025-07-23 NOTE — ANESTHESIA PREPROCEDURE EVALUATION
Procedure:  EXCISION WIDE LESION LEFT GROIN SKIN AND SUBCUTANEOUS LESION (Left: Groin)    Relevant Problems   ANESTHESIA (within normal limits)      CARDIO   (+) Gestational hypertension      ENDO (within normal limits)      GI/HEPATIC  NPO confirmed  BMI 29.9   (+) Gastroesophageal reflux disease without esophagitis      /RENAL (within normal limits)      HEMATOLOGY (within normal limits)      PULMONARY (within normal limits)   (-) Sleep apnea   (-) URI (upper respiratory infection)     Allergies   Allergen Reactions    Other Allergic Rhinitis     enviornmental     Social History[1]    Current Outpatient Medications   Medication Instructions    b complex vitamins capsule 1 capsule, Daily    calcium carbonate (OS-NADJA) 600 mg, 2 times daily with meals    Cholecalciferol (VITAMIN D3) 1,000 Units, Daily    [START ON 8/4/2025] methylphenidate (CONCERTA) 27 mg, Oral, Daily    ondansetron (ZOFRAN) 4 mg, Oral, Every 8 hours PRN     Lab Results   Component Value Date    WBC 8.34 02/24/2025    HGB 13.1 02/24/2025    HCT 40.3 02/24/2025     02/24/2025    SODIUM 137 02/24/2025    K 3.8 02/24/2025     02/24/2025    CO2 26 02/24/2025    BUN 16 02/24/2025    CREATININE 0.83 02/24/2025    GLUC 72 07/21/2020    HGBA1C 5.1 02/24/2025    AST 13 02/24/2025    ALT 11 02/24/2025    ALKPHOS 27 (L) 02/24/2025    TBILI 0.39 02/24/2025    ALB 4.7 02/24/2025     There were no vitals filed for this visit.    EKG 2/24/25  Normal sinus rhythm  Possible Left atrial enlargement  Borderline ECG  No previous ECGs available  Confirmed by Aneesh Pradhan ([id number]) on 2/24/2025 3:42:24 PM     Physical Exam    Airway     Mallampati score: I  TM Distance: >3 FB  Neck ROM: full  Mouth opening: >= 4 cm      Cardiovascular  Rhythm: regular, Rate: normal, No murmurCardiovascular exam normal    Dental   No notable dental hx     Pulmonary  Pulmonary exam normal Breath sounds clear to auscultation, No wheezes    Neurological    She appears awake,  alert and oriented x3.      Other Findings  post-pubertal.      Anesthesia Plan  ASA Score- 2     Anesthesia Type- general with ASA Monitors.         Additional Monitors:     Airway Plan: LMA and LMA.           Plan Factors-Exercise tolerance (METS): >4 METS.    Chart reviewed. EKG reviewed.  Existing labs reviewed. Patient summary reviewed.    Patient is not a current smoker.              Induction- intravenous.    Postoperative Plan- Plan for postoperative opioid use.   Monitoring Plan - Monitoring plan - standard ASA monitoring  Post Operative Pain Plan - non-opiod analgesics and plan for postoperative opioid use    Perioperative Resuscitation Plan - Level 1 - Full Code.       Informed Consent- Anesthetic plan and risks discussed with patient.  I personally reviewed this patient with the CRNA. Discussed and agreed on the Anesthesia Plan with the CRNA..      NPO Status:  Vitals Value Taken Time   Date of last liquid 07/23/25 07/24/25 06:47   Time of last liquid 2200 07/24/25 06:47   Date of last solid 07/23/25 07/24/25 06:47   Time of last solid 2200 07/24/25 06:47              [1]   Social History  Tobacco Use    Smoking status: Never    Smokeless tobacco: Never   Vaping Use    Vaping status: Never Used   Substance Use Topics    Alcohol use: Yes     Comment: social    Drug use: Never

## 2025-07-24 ENCOUNTER — ANESTHESIA (OUTPATIENT)
Dept: PERIOP | Facility: HOSPITAL | Age: 35
End: 2025-07-24
Payer: COMMERCIAL

## 2025-07-24 ENCOUNTER — HOSPITAL ENCOUNTER (OUTPATIENT)
Facility: HOSPITAL | Age: 35
Setting detail: OUTPATIENT SURGERY
Discharge: HOME | End: 2025-07-24
Attending: SURGERY | Admitting: SURGERY
Payer: COMMERCIAL

## 2025-07-24 VITALS
BODY MASS INDEX: 29.73 KG/M2 | HEART RATE: 70 BPM | DIASTOLIC BLOOD PRESSURE: 65 MMHG | TEMPERATURE: 97.6 F | HEIGHT: 66 IN | SYSTOLIC BLOOD PRESSURE: 111 MMHG | OXYGEN SATURATION: 99 % | WEIGHT: 185 LBS | RESPIRATION RATE: 20 BRPM

## 2025-07-24 DIAGNOSIS — L08.9 OOZING SKIN INFLAMMATION: ICD-10-CM

## 2025-07-24 DIAGNOSIS — L98.9 LESION OF SUBCUTANEOUS TISSUE: ICD-10-CM

## 2025-07-24 LAB
EXT PREGNANCY TEST URINE: NEGATIVE
EXT. CONTROL: NORMAL

## 2025-07-24 PROCEDURE — 11406 EXC TR-EXT B9+MARG >4.0 CM: CPT

## 2025-07-24 PROCEDURE — 88305 TISSUE EXAM BY PATHOLOGIST: CPT | Performed by: PATHOLOGY

## 2025-07-24 PROCEDURE — NC001 PR NO CHARGE: Performed by: SURGERY

## 2025-07-24 PROCEDURE — 12034 INTMD RPR S/TR/EXT 7.6-12.5: CPT

## 2025-07-24 PROCEDURE — 12034 INTMD RPR S/TR/EXT 7.6-12.5: CPT | Performed by: SURGERY

## 2025-07-24 PROCEDURE — 11406 EXC TR-EXT B9+MARG >4.0 CM: CPT | Performed by: SURGERY

## 2025-07-24 PROCEDURE — 81025 URINE PREGNANCY TEST: CPT | Performed by: SURGERY

## 2025-07-24 RX ORDER — SODIUM CHLORIDE, SODIUM LACTATE, POTASSIUM CHLORIDE, CALCIUM CHLORIDE 600; 310; 30; 20 MG/100ML; MG/100ML; MG/100ML; MG/100ML
125 INJECTION, SOLUTION INTRAVENOUS CONTINUOUS
Status: DISCONTINUED | OUTPATIENT
Start: 2025-07-24 | End: 2025-07-24 | Stop reason: HOSPADM

## 2025-07-24 RX ORDER — PROMETHAZINE HYDROCHLORIDE 25 MG/ML
12.5 INJECTION, SOLUTION INTRAMUSCULAR; INTRAVENOUS ONCE AS NEEDED
Status: DISCONTINUED | OUTPATIENT
Start: 2025-07-24 | End: 2025-07-24 | Stop reason: HOSPADM

## 2025-07-24 RX ORDER — MAGNESIUM HYDROXIDE 1200 MG/15ML
LIQUID ORAL AS NEEDED
Status: DISCONTINUED | OUTPATIENT
Start: 2025-07-24 | End: 2025-07-24 | Stop reason: HOSPADM

## 2025-07-24 RX ORDER — PROPOFOL 10 MG/ML
INJECTION, EMULSION INTRAVENOUS AS NEEDED
Status: DISCONTINUED | OUTPATIENT
Start: 2025-07-24 | End: 2025-07-24

## 2025-07-24 RX ORDER — KETOROLAC TROMETHAMINE 30 MG/ML
INJECTION, SOLUTION INTRAMUSCULAR; INTRAVENOUS AS NEEDED
Status: DISCONTINUED | OUTPATIENT
Start: 2025-07-24 | End: 2025-07-24

## 2025-07-24 RX ORDER — DEXAMETHASONE SODIUM PHOSPHATE 10 MG/ML
INJECTION, SOLUTION INTRAMUSCULAR; INTRAVENOUS AS NEEDED
Status: DISCONTINUED | OUTPATIENT
Start: 2025-07-24 | End: 2025-07-24

## 2025-07-24 RX ORDER — MIDAZOLAM HYDROCHLORIDE 2 MG/2ML
INJECTION, SOLUTION INTRAMUSCULAR; INTRAVENOUS AS NEEDED
Status: DISCONTINUED | OUTPATIENT
Start: 2025-07-24 | End: 2025-07-24

## 2025-07-24 RX ORDER — LIDOCAINE HYDROCHLORIDE 10 MG/ML
INJECTION, SOLUTION EPIDURAL; INFILTRATION; INTRACAUDAL; PERINEURAL AS NEEDED
Status: DISCONTINUED | OUTPATIENT
Start: 2025-07-24 | End: 2025-07-24

## 2025-07-24 RX ORDER — FENTANYL CITRATE/PF 50 MCG/ML
50 SYRINGE (ML) INJECTION
Status: DISCONTINUED | OUTPATIENT
Start: 2025-07-24 | End: 2025-07-24 | Stop reason: HOSPADM

## 2025-07-24 RX ORDER — LIDOCAINE HYDROCHLORIDE AND EPINEPHRINE 10; 10 MG/ML; UG/ML
INJECTION, SOLUTION INFILTRATION; PERINEURAL AS NEEDED
Status: DISCONTINUED | OUTPATIENT
Start: 2025-07-24 | End: 2025-07-24 | Stop reason: HOSPADM

## 2025-07-24 RX ORDER — ONDANSETRON 4 MG/1
4 TABLET, FILM COATED ORAL EVERY 8 HOURS PRN
Qty: 20 TABLET | Refills: 0 | Status: SHIPPED | OUTPATIENT
Start: 2025-07-24

## 2025-07-24 RX ORDER — FENTANYL CITRATE 50 UG/ML
INJECTION, SOLUTION INTRAMUSCULAR; INTRAVENOUS AS NEEDED
Status: DISCONTINUED | OUTPATIENT
Start: 2025-07-24 | End: 2025-07-24

## 2025-07-24 RX ORDER — ONDANSETRON 2 MG/ML
INJECTION INTRAMUSCULAR; INTRAVENOUS AS NEEDED
Status: DISCONTINUED | OUTPATIENT
Start: 2025-07-24 | End: 2025-07-24

## 2025-07-24 RX ORDER — HYDROMORPHONE HCL IN WATER/PF 6 MG/30 ML
0.2 PATIENT CONTROLLED ANALGESIA SYRINGE INTRAVENOUS
Status: DISCONTINUED | OUTPATIENT
Start: 2025-07-24 | End: 2025-07-24 | Stop reason: HOSPADM

## 2025-07-24 RX ORDER — CEFAZOLIN SODIUM 2 G/50ML
2000 SOLUTION INTRAVENOUS ONCE
Status: COMPLETED | OUTPATIENT
Start: 2025-07-24 | End: 2025-07-24

## 2025-07-24 RX ORDER — ONDANSETRON 2 MG/ML
4 INJECTION INTRAMUSCULAR; INTRAVENOUS ONCE AS NEEDED
Status: DISCONTINUED | OUTPATIENT
Start: 2025-07-24 | End: 2025-07-24 | Stop reason: HOSPADM

## 2025-07-24 RX ORDER — SODIUM CHLORIDE, SODIUM LACTATE, POTASSIUM CHLORIDE, CALCIUM CHLORIDE 600; 310; 30; 20 MG/100ML; MG/100ML; MG/100ML; MG/100ML
INJECTION, SOLUTION INTRAVENOUS CONTINUOUS PRN
Status: DISCONTINUED | OUTPATIENT
Start: 2025-07-24 | End: 2025-07-24

## 2025-07-24 RX ORDER — FENTANYL CITRATE/PF 50 MCG/ML
25 SYRINGE (ML) INJECTION
Status: DISCONTINUED | OUTPATIENT
Start: 2025-07-24 | End: 2025-07-24 | Stop reason: HOSPADM

## 2025-07-24 RX ORDER — BUPIVACAINE HYDROCHLORIDE 5 MG/ML
INJECTION, SOLUTION EPIDURAL; INTRACAUDAL; PERINEURAL AS NEEDED
Status: DISCONTINUED | OUTPATIENT
Start: 2025-07-24 | End: 2025-07-24 | Stop reason: HOSPADM

## 2025-07-24 RX ORDER — ACETAMINOPHEN 325 MG/1
975 TABLET ORAL ONCE
Status: COMPLETED | OUTPATIENT
Start: 2025-07-24 | End: 2025-07-24

## 2025-07-24 RX ADMIN — ACETAMINOPHEN 975 MG: 325 TABLET ORAL at 07:12

## 2025-07-24 RX ADMIN — PROPOFOL 30 MG: 10 INJECTION, EMULSION INTRAVENOUS at 08:49

## 2025-07-24 RX ADMIN — SODIUM CHLORIDE, SODIUM LACTATE, POTASSIUM CHLORIDE, AND CALCIUM CHLORIDE 125 ML/HR: .6; .31; .03; .02 INJECTION, SOLUTION INTRAVENOUS at 07:13

## 2025-07-24 RX ADMIN — FENTANYL CITRATE 50 MCG: 50 INJECTION, SOLUTION INTRAMUSCULAR; INTRAVENOUS at 07:30

## 2025-07-24 RX ADMIN — ONDANSETRON 4 MG: 2 INJECTION INTRAMUSCULAR; INTRAVENOUS at 08:14

## 2025-07-24 RX ADMIN — PROPOFOL 40 MG: 10 INJECTION, EMULSION INTRAVENOUS at 08:46

## 2025-07-24 RX ADMIN — CEFAZOLIN SODIUM 2000 MG: 2 SOLUTION INTRAVENOUS at 07:32

## 2025-07-24 RX ADMIN — KETOROLAC TROMETHAMINE 15 MG: 30 INJECTION, SOLUTION INTRAMUSCULAR at 08:38

## 2025-07-24 RX ADMIN — PROPOFOL 30 MG: 10 INJECTION, EMULSION INTRAVENOUS at 08:50

## 2025-07-24 RX ADMIN — SODIUM CHLORIDE, SODIUM LACTATE, POTASSIUM CHLORIDE, AND CALCIUM CHLORIDE: .6; .31; .03; .02 INJECTION, SOLUTION INTRAVENOUS at 07:26

## 2025-07-24 RX ADMIN — FENTANYL CITRATE 50 MCG: 50 INJECTION, SOLUTION INTRAMUSCULAR; INTRAVENOUS at 07:59

## 2025-07-24 RX ADMIN — PROPOFOL 100 MG: 10 INJECTION, EMULSION INTRAVENOUS at 08:01

## 2025-07-24 RX ADMIN — PROPOFOL 200 MG: 10 INJECTION, EMULSION INTRAVENOUS at 07:30

## 2025-07-24 RX ADMIN — MIDAZOLAM 2 MG: 1 INJECTION INTRAMUSCULAR; INTRAVENOUS at 07:24

## 2025-07-24 RX ADMIN — LIDOCAINE HYDROCHLORIDE 50 MG: 10 INJECTION, SOLUTION EPIDURAL; INFILTRATION; INTRACAUDAL; PERINEURAL at 07:30

## 2025-07-24 RX ADMIN — PROPOFOL 60 MG: 10 INJECTION, EMULSION INTRAVENOUS at 07:59

## 2025-07-24 RX ADMIN — DEXAMETHASONE SODIUM PHOSPHATE 5 MG: 10 INJECTION, SOLUTION INTRAMUSCULAR; INTRAVENOUS at 07:40

## 2025-07-24 RX ADMIN — PROPOFOL 40 MG: 10 INJECTION, EMULSION INTRAVENOUS at 07:57

## 2025-07-24 NOTE — OP NOTE
OPERATIVE REPORT  PATIENT NAME: Cori Morejon    :  1990  MRN: [id number]  Pt Location: [facility] OR ROOM 02    SURGERY DATE: 2025    Surgeons and Role:     * Alexandre Zaman MD - Primary     * Ofelia Arroyo PA-C - Assisting    Preop Diagnosis:  Lesion of subcutaneous tissue [L98.9]  Oozing skin inflammation [L08.9]    Post-Op Diagnosis Codes:     * Lesion of subcutaneous tissue [L98.9]     * Oozing skin inflammation [L08.9]    Procedure(s):  Left - EXCISION WIDE LESION LEFT GROIN SKIN AND SUBCUTANEOUS LESION    Specimen(s):  ID Type Source Tests Collected by Time Destination   1 : LEFT GROIN SKIN AND SUBCUTANEOUS LESION Tissue Groin TISSUE EXAM Alexandre Zaman MD 2025 [id number]      Estimated Blood Loss:   Minimal    Anesthesia Type:   General/LMA  Local    Operative Indications:  Lesion of subcutaneous tissue [L98.9]  Oozing skin inflammation [L08.9]  35F with recurrent inflammatory change of the left vulvar/groin region consistent with possible hidradenitis suppurativa versus folliculitis versus ruptured sebaceous cyst. Consented for excision of the area for definitive diagnosis and therapeutic benefit.    Operative Findings:  -Elliptical incision marked out to include the overlying thin hyperpigmented skin with sinus tracts and the deep induration and firmness  -All chronic inflammatory change was excised with approximately 1-2mm margin   -Size of area of skin change approximately 3 x 0.5cm in size  -Size of deeper inflammatory tissue approximately 7 x 2cm  -Size of inferior based lesion in the subcutaneous fat was 1cm  -Defect measured 8 x 2 x 3.5cm prior to closure  -Was also able to include a mobile soft subcutaneous lesion within the specimen, this lesion was within the subcutaneous fat at the inferior aspect of the specimen  -Specimen marked short superior, long lateral with black silk suture  -Area of inferior subcutaneous lesion marked with a white vicryl suture  -Wound closed in layers  with interrupted 2-0 vicryl, interrupted 3-0 vicryl, and running 4-0 monocryl       Complications:   None    Procedure and Technique:  The patient was seen in preop, left groin marked, taken to OR, pneumoboots on and activated, perioperative antibiotics given. General anesthesia induced with LMA, patient placed into the lithotomy position with stirrups and padded to the bed and secured with a belt. Surgical area clipped, prepped, draped in the usual sterile fashion, surgical timeout performed. Ellipse marked out to include all inflammatory tissue. Incision made with a scalpel, local instilled throughout the dissection once the extent of the resection was easily identifiable. Dissection continued with electrocautery. Palpable mobile lesion within the subcutaneous fat at the inferior aspect of the specimen, not deep to the overlying skin change, was included in the resection and the specimen was marked as above for pathology review. Wound irrigated, hemostasis achieved, local was instilled. Wound closed in layers with interrupted 2-0 vicryl, interrupted 3-0 vicryl, and running 4-0 monocryl. All counts correct. Skin cleansed, sterile dressings applied. Patient returned to the supine position and awoken from anesthesia. She tolerated the procedure well and was taken to PACU in stable condition.  I was present for the entire procedure and a physician assistant was required during the procedure for retraction, tissue handling, dissection and suturing.    Patient Disposition:  PACU     This procedure was not performed to treat primary cutaneous melanoma through wide local excision       SIGNATURE: Alexandre Zaman MD  DATE: July 24, 2025  TIME: 8:38 AM

## 2025-07-24 NOTE — ANESTHESIA POSTPROCEDURE EVALUATION
Post-Op Assessment Note    CV Status:  Stable  Pain Score: 0    Pain management: adequate    Multimodal analgesia used between 6 hours prior to anesthesia start to PACU discharge    Mental Status:  Alert and awake   Hydration Status:  Euvolemic   PONV Controlled:  Controlled   Airway Patency:  Patent     Post Op Vitals Reviewed: Yes    No anethesia notable event occurred.    Staff: Anesthesiologist           Last Filed PACU Vitals:  Vitals Value Taken Time   Temp 99.7F    Pulse 93    /56    Resp 24    SpO2 100%        Modified Oscar:     Vitals Value Taken Time   Activity 2 07/24/25 09:20   Respiration 2 07/24/25 09:20   Circulation 2 07/24/25 09:20   Consciousness 2 07/24/25 09:20   Oxygen Saturation 2 07/24/25 09:20     Modified Oscar Score: 10

## 2025-07-24 NOTE — DISCHARGE INSTR - AVS FIRST PAGE
DISCHARGE INSTRUCTIONS:   Light activity   No heavy lifting, limit lifting to 15-20 pounds for 2 weeks   No limitations for diet   Please take medications as prescribed   Please take acetaminophen/ibuprofen scheduled every 4-6 hours for pain    You have glue over your incision. This will peel off on its own. You may shower starting tomorrow, soap and water right over incision, do not scrub. Please place ABD over incision so thigh doesn't rub against incision.     Please notify general surgery office if you experience:  Fever over 101.5F  Persistent nausea or vomiting  Severe uncontrolled pain  Redness, tenderness, or signs of infection near incisions (pain, swelling, redness, yellow/green drainage)  Active or persistent bleeding from incisions  Chest pain, shortness of breath     Please call our office with any additional questions or concerns

## 2025-07-24 NOTE — H&P
Site marked, proceed to OR for wide excision of the inflammatory lesion of the left groin.        H and P    Assessment & Plan  Lesion of subcutaneous tissue  34F with personal history of recurrent skin and subcutaneous inflammation and infection events in the bilateral inguinal regions, now with persistent skin changes, expressible purulence, and palpable subcutaneous inflammatory lesion in the left vulvar region, no active cellulitis or fluctuance. No urgent incision and drainage indicated today. No antibiotics indicated as the site is draining and there are no signs of cellulitis or tenderness.     The patient desires definitive excision of the skin and subcutaneous finding for definitive diagnosis and therapeutic benefit. We are in agreement to proceed in the OR under sedation and local.      Informed consent obtained, discussed risks and benefits including bleeding, infection, wound healing difficulties, need for further procedures, recurrence.     Final pathology may guide further recommendations in terms of specific dermatologic management or follow up.     The patient will call me prior to OR with any worsening of her local symptoms.     Orders:    Case request operating room: EXCISION WIDE LESION LEFT GROIN SKIN AND SUBCUTANEOUS LESION; Standing     Oozing skin inflammation  As above.  Orders:    Case request operating room: EXCISION WIDE LESION LEFT GROIN SKIN AND SUBCUTANEOUS LESION; Standing              History of Present Illness     Cori Morejon is a 34 y.o. female with prior recurrent inguinal and vulvar skin and subcutaneous tissue inflammation/infection events. Possible hidradenitis versus follicultis versus inclusion cysts. Has a history of acne was following with dermatology for that. Prior R ear lesion removed, some atypical cells present, has not come back. No personal history of skin cancer. Prior inflammation events in the skin have always healed and resolved with incision and  drainage or hot compresses.      Current area in left groin/vulva has been persistent for 4-6 months. Was infected initially, underwent I and D, and never entirely healed or resolved. There are some draining sinuses present with skin thinning. The area is overall nontender but has not fully healed or dried up. Had some antibiotics for a dental infection, they did not improve the groin area. There is a palpable firmness deep to the skin lesion and medial to it which is mobile and has not changed in multiple months, although has not resolved.     Paternal aunt had stomach cancer, paternal first cousin with breast cancer in her 30's, BRCA carrier. The patient's father was tested and is negative for BRCA. No family history of melanoma or skin cancers.  History of Present Illness     Review of Systems   Constitutional: Negative.    Skin:  Positive for color change and wound.        Left groin skin inflammation and palpable change   Hematological: Negative.    All other systems reviewed and are negative.   as per HPI.  Past Medical History  [Past Medical History]    [Past Medical History]       Diagnosis Date    2004     Seasonal    Gestational hypertension 2020    Pregnancy with uncertain fetal viability       Last assessed - 15    Spontaneous       Varicella       childhood     [Past Surgical History]    [Past Surgical History]        Procedure Laterality Date    DENTAL SURGERY        WISDOM TOOTH EXTRACTION         [Family History]    [Family History]         Problem Relation Name Age of Onset    Hypertension Mother Griselda      Arthritis Mother Griselda           S/P TKR    Hypertension Father Rodrick      Arthritis Father Rodrick           S/P TKR    Diabetes Father Rodrick           Prediabetes    Hyperlipidemia Father Rodrick      Atrial fibrillation Father Rodrick      Polycystic ovary syndrome Sister Lisa      Hyperlipidemia Sister Lisa           PCOS    Thyroid disease Brother Farhan      No  Known Problems Daughter        No Known Problems Daughter        No Known Problems Son        Diabetes Maternal Grandmother        Heart disease Maternal Grandfather Grandpop           “murmur”    Diabetes Maternal Grandfather Grandpop      Heart attack Maternal Grandfather Grandpop      Diabetes Paternal Grandmother Mom mom      Heart attack Paternal Grandfather Poppop      Heart disease Paternal Grandfather [place]           Fatal MI in 50s    Alcohol abuse Maternal Uncle Decline      Drug abuse Maternal Uncle Decline      Substance Abuse Maternal Uncle Decline      Drug abuse Paternal Uncle Decline      Alcohol abuse Paternal Uncle Decline      Substance Abuse Paternal Uncle Decline      Breast cancer Cousin Yarely 35         35    Mental illness Neg Hx        Colon cancer Neg Hx        Ovarian cancer Neg Hx          reports that she has never smoked. She has never used smokeless tobacco. She reports current alcohol use. She reports that she does not use drugs.       Current Outpatient Medications   Medication Instructions    b complex vitamins capsule 1 capsule, Oral, Daily    calcium carbonate (OS-NADJA) 600 mg, Oral, 2 times daily with meals    Cholecalciferol (VITAMIN D3) 1,000 Units, Oral, Daily    methylphenidate (CONCERTA) 27 mg, Oral, Daily    ondansetron (ZOFRAN) 4 mg, Oral, Every 8 hours PRN   [Allergies]    [Allergies]        Allergen Reactions    Other Allergic Rhinitis       enviornmental     [Medications Ordered Prior to Encounter]     [Medications Ordered Prior to Encounter]         Current Outpatient Medications on File Prior to Visit   Medication Sig Dispense Refill    b complex vitamins capsule Take 1 capsule by mouth in the morning.        calcium carbonate (OS-NADJA) 600 MG tablet Take 600 mg by mouth in the morning and 600 mg in the evening. Take with meals.        Cholecalciferol (VITAMIN D3) 1,000 units tablet Take 1,000 Units by mouth in the morning.        methylphenidate (CONCERTA) 27 MG ER  tablet Take 1 tablet (27 mg total) by mouth daily Max Daily Amount: 27 mg Do not start before May 6, 2025. 90 tablet 0    ondansetron (ZOFRAN) 4 mg tablet Take 1 tablet (4 mg total) by mouth every 8 (eight) hours as needed for nausea or vomiting 20 tablet 0    [DISCONTINUED] spironolactone (ALDACTONE) 50 mg tablet Take 1 tablet (50 mg total) by mouth daily 90 tablet 3      No current facility-administered medications on file prior to visit.     [Social History]    [Social History]        Tobacco Use    Smoking status: Never    Smokeless tobacco: Never   Vaping Use    Vaping status: Never Used   Substance and Sexual Activity    Alcohol use: Yes       Comment: social    Drug use: Never    Sexual activity: Yes       Partners: Male       Birth control/protection: Male Sterilization        Objective      Physical Exam  Vitals reviewed.   Constitutional:       General: She is not in acute distress.     Appearance: She is not toxic-appearing.   HENT:      Head: Normocephalic.      Nose: No congestion.      Mouth/Throat:      Mouth: Mucous membranes are moist.      Eyes:      Pupils: Pupils are equal, round, and reactive to light.         Cardiovascular:      Rate and Rhythm: Normal rate.   Pulmonary:      Effort: Pulmonary effort is normal. No respiratory distress.   Abdominal:      Palpations: Abdomen is soft.      Musculoskeletal:         General: Normal range of motion.      Cervical back: Normal range of motion.      Skin:     General: Skin is warm.      Capillary Refill: Capillary refill takes less than 2 seconds.      Findings: Lesion present.      Comments: Area of chronic inflammatory change and skin thinning in the left inguinal region on the left vulva with some draining sinuses and expressible purulence, no surrounding cellulitis.     Medial to the skin change and deeper is a palpable, mobile, nontender subcutaneous lesion which may be related to the nearby superficial skin changes and purulent drainage       Neurological:      General: No focal deficit present.      Mental Status: She is alert. Mental status is at baseline.      Psychiatric:         Mood and Affect: Mood normal.

## 2025-07-24 NOTE — QUICK NOTE
Voicemail left for the patient's  with brief update on the case and our plan to transfer to PACU shortly.

## 2025-07-29 PROCEDURE — 88305 TISSUE EXAM BY PATHOLOGIST: CPT | Performed by: PATHOLOGY

## 2025-08-06 ENCOUNTER — OFFICE VISIT (OUTPATIENT)
Dept: SURGERY | Facility: CLINIC | Age: 35
End: 2025-08-06

## 2025-08-06 VITALS
SYSTOLIC BLOOD PRESSURE: 126 MMHG | DIASTOLIC BLOOD PRESSURE: 80 MMHG | BODY MASS INDEX: 30.15 KG/M2 | TEMPERATURE: 98 F | RESPIRATION RATE: 16 BRPM | HEIGHT: 66 IN | HEART RATE: 72 BPM | OXYGEN SATURATION: 99 % | WEIGHT: 187.6 LBS

## 2025-08-06 DIAGNOSIS — Z09 S/P EXCISION OF SKIN LESION, FOLLOW-UP EXAM: Primary | ICD-10-CM

## 2025-08-06 PROCEDURE — 99024 POSTOP FOLLOW-UP VISIT: CPT | Performed by: SURGERY

## 2025-08-07 PROBLEM — Z09 S/P EXCISION OF SKIN LESION, FOLLOW-UP EXAM: Status: ACTIVE | Noted: 2025-08-07

## 2025-08-07 PROBLEM — L08.9: Status: RESOLVED | Noted: 2025-06-04 | Resolved: 2025-08-07

## 2025-08-07 PROBLEM — L98.9 LESION OF SUBCUTANEOUS TISSUE: Status: RESOLVED | Noted: 2025-06-04 | Resolved: 2025-08-07

## (undated) DEVICE — PREMIUM DRY TRAY LF: Brand: MEDLINE INDUSTRIES, INC.

## (undated) DEVICE — Device

## (undated) DEVICE — INVIEW CLEAR LEGGINGS: Brand: CONVERTORS

## (undated) DEVICE — BETHLEHEM UNIVERSAL MINOR GEN: Brand: CARDINAL HEALTH

## (undated) DEVICE — 4-PORT MANIFOLD: Brand: NEPTUNE 2

## (undated) DEVICE — NEPTUNE E-SEP SMOKE EVACUATION PENCIL, COATED, 70MM BLADE, PUSH BUTTON SWITCH: Brand: NEPTUNE E-SEP

## (undated) DEVICE — UNDER BUTTOCKS DRAPE: Brand: CONVERTORS

## (undated) DEVICE — ANTIBACTERIAL UNDYED BRAIDED (POLYGLACTIN 910), SYNTHETIC ABSORBABLE SUTURE: Brand: COATED VICRYL

## (undated) DEVICE — MEDI-VAC YANKAUER SUCTION HANDLE W/STRAIGHT TIP & CONTROL VENT: Brand: CARDINAL HEALTH

## (undated) DEVICE — GARMENT,MEDLINE,DVT,INT,CALF,FOAM,MED: Brand: MEDLINE

## (undated) DEVICE — INTENDED FOR TISSUE SEPARATION, AND OTHER PROCEDURES THAT REQUIRE A SHARP SURGICAL BLADE TO PUNCTURE OR CUT.: Brand: BARD-PARKER ® CARBON RIB-BACK BLADES

## (undated) DEVICE — POOLE SURGICAL SUCTION SET WITH TUBING,NON-CONDUCTIVE: Brand: ARGYLE